# Patient Record
Sex: MALE | Race: OTHER | URBAN - METROPOLITAN AREA
[De-identification: names, ages, dates, MRNs, and addresses within clinical notes are randomized per-mention and may not be internally consistent; named-entity substitution may affect disease eponyms.]

---

## 2018-03-25 ENCOUNTER — EMERGENCY (EMERGENCY)
Facility: HOSPITAL | Age: 68
LOS: 0 days | Discharge: HOME | End: 2018-03-25
Attending: EMERGENCY MEDICINE
Payer: MEDICAID

## 2018-03-25 VITALS
DIASTOLIC BLOOD PRESSURE: 91 MMHG | HEART RATE: 80 BPM | RESPIRATION RATE: 16 BRPM | SYSTOLIC BLOOD PRESSURE: 169 MMHG | OXYGEN SATURATION: 98 %

## 2018-03-25 VITALS
WEIGHT: 169.98 LBS | SYSTOLIC BLOOD PRESSURE: 181 MMHG | OXYGEN SATURATION: 99 % | HEIGHT: 65 IN | RESPIRATION RATE: 18 BRPM | DIASTOLIC BLOOD PRESSURE: 116 MMHG | HEART RATE: 71 BPM

## 2018-03-25 DIAGNOSIS — I10 ESSENTIAL (PRIMARY) HYPERTENSION: ICD-10-CM

## 2018-03-25 DIAGNOSIS — R42 DIZZINESS AND GIDDINESS: ICD-10-CM

## 2018-03-25 DIAGNOSIS — R11.10 VOMITING, UNSPECIFIED: ICD-10-CM

## 2018-03-25 DIAGNOSIS — E11.9 TYPE 2 DIABETES MELLITUS WITHOUT COMPLICATIONS: ICD-10-CM

## 2018-03-25 LAB
ALBUMIN SERPL ELPH-MCNC: 3.4 G/DL — SIGNIFICANT CHANGE UP (ref 3.3–5)
ALP SERPL-CCNC: 73 U/L — SIGNIFICANT CHANGE UP (ref 40–120)
ALT FLD-CCNC: 65 U/L — SIGNIFICANT CHANGE UP (ref 12–78)
ANION GAP SERPL CALC-SCNC: 8 MMOL/L — SIGNIFICANT CHANGE UP (ref 5–17)
APTT BLD: 30.8 SEC — SIGNIFICANT CHANGE UP (ref 27.5–37.4)
AST SERPL-CCNC: 49 U/L — HIGH (ref 15–37)
BASOPHILS # BLD AUTO: 0.07 K/UL — SIGNIFICANT CHANGE UP (ref 0–0.2)
BASOPHILS NFR BLD AUTO: 0.7 % — SIGNIFICANT CHANGE UP (ref 0–2)
BILIRUB SERPL-MCNC: 0.2 MG/DL — SIGNIFICANT CHANGE UP (ref 0.2–1.2)
BUN SERPL-MCNC: 15 MG/DL — SIGNIFICANT CHANGE UP (ref 7–23)
CALCIUM SERPL-MCNC: 9.8 MG/DL — SIGNIFICANT CHANGE UP (ref 8.5–10.1)
CHLORIDE SERPL-SCNC: 101 MMOL/L — SIGNIFICANT CHANGE UP (ref 96–108)
CK MB CFR SERPL CALC: 1.3 NG/ML — SIGNIFICANT CHANGE UP (ref 0.5–3.6)
CO2 SERPL-SCNC: 29 MMOL/L — SIGNIFICANT CHANGE UP (ref 22–31)
CREAT SERPL-MCNC: 1.09 MG/DL — SIGNIFICANT CHANGE UP (ref 0.5–1.3)
EOSINOPHIL # BLD AUTO: 0.21 K/UL — SIGNIFICANT CHANGE UP (ref 0–0.5)
EOSINOPHIL NFR BLD AUTO: 2 % — SIGNIFICANT CHANGE UP (ref 0–6)
GLUCOSE BLDC GLUCOMTR-MCNC: 118 MG/DL — HIGH (ref 70–99)
GLUCOSE SERPL-MCNC: 133 MG/DL — HIGH (ref 70–99)
HCT VFR BLD CALC: 45.8 % — SIGNIFICANT CHANGE UP (ref 39–50)
HGB BLD-MCNC: 15 G/DL — SIGNIFICANT CHANGE UP (ref 13–17)
IMM GRANULOCYTES NFR BLD AUTO: 0.4 % — SIGNIFICANT CHANGE UP (ref 0–1.5)
INR BLD: 0.94 RATIO — SIGNIFICANT CHANGE UP (ref 0.88–1.16)
LYMPHOCYTES # BLD AUTO: 2.28 K/UL — SIGNIFICANT CHANGE UP (ref 1–3.3)
LYMPHOCYTES # BLD AUTO: 22.1 % — SIGNIFICANT CHANGE UP (ref 13–44)
MCHC RBC-ENTMCNC: 28.4 PG — SIGNIFICANT CHANGE UP (ref 27–34)
MCHC RBC-ENTMCNC: 32.8 GM/DL — SIGNIFICANT CHANGE UP (ref 32–36)
MCV RBC AUTO: 86.7 FL — SIGNIFICANT CHANGE UP (ref 80–100)
MONOCYTES # BLD AUTO: 0.9 K/UL — SIGNIFICANT CHANGE UP (ref 0–0.9)
MONOCYTES NFR BLD AUTO: 8.7 % — SIGNIFICANT CHANGE UP (ref 2–14)
NEUTROPHILS # BLD AUTO: 6.82 K/UL — SIGNIFICANT CHANGE UP (ref 1.8–7.4)
NEUTROPHILS NFR BLD AUTO: 66.1 % — SIGNIFICANT CHANGE UP (ref 43–77)
PLATELET # BLD AUTO: 316 K/UL — SIGNIFICANT CHANGE UP (ref 150–400)
POTASSIUM SERPL-MCNC: 3.6 MMOL/L — SIGNIFICANT CHANGE UP (ref 3.5–5.3)
POTASSIUM SERPL-SCNC: 3.6 MMOL/L — SIGNIFICANT CHANGE UP (ref 3.5–5.3)
PROT SERPL-MCNC: 7.5 GM/DL — SIGNIFICANT CHANGE UP (ref 6–8.3)
PROTHROM AB SERPL-ACNC: 10.2 SEC — SIGNIFICANT CHANGE UP (ref 9.8–12.7)
RBC # BLD: 5.28 M/UL — SIGNIFICANT CHANGE UP (ref 4.2–5.8)
RBC # FLD: 13.2 % — SIGNIFICANT CHANGE UP (ref 10.3–14.5)
SODIUM SERPL-SCNC: 138 MMOL/L — SIGNIFICANT CHANGE UP (ref 135–145)
TROPONIN I SERPL-MCNC: <.015 NG/ML — SIGNIFICANT CHANGE UP (ref 0.01–0.04)
WBC # BLD: 10.32 K/UL — SIGNIFICANT CHANGE UP (ref 3.8–10.5)
WBC # FLD AUTO: 10.32 K/UL — SIGNIFICANT CHANGE UP (ref 3.8–10.5)

## 2018-03-25 PROCEDURE — 70498 CT ANGIOGRAPHY NECK: CPT | Mod: 26

## 2018-03-25 PROCEDURE — 70450 CT HEAD/BRAIN W/O DYE: CPT | Mod: 26,59

## 2018-03-25 PROCEDURE — 99285 EMERGENCY DEPT VISIT HI MDM: CPT

## 2018-03-25 PROCEDURE — 70496 CT ANGIOGRAPHY HEAD: CPT | Mod: 26

## 2018-03-25 RX ORDER — SODIUM CHLORIDE 9 MG/ML
1000 INJECTION INTRAMUSCULAR; INTRAVENOUS; SUBCUTANEOUS ONCE
Qty: 0 | Refills: 0 | Status: COMPLETED | OUTPATIENT
Start: 2018-03-25 | End: 2018-03-25

## 2018-03-25 RX ORDER — MECLIZINE HCL 12.5 MG
1 TABLET ORAL
Qty: 15 | Refills: 0 | OUTPATIENT
Start: 2018-03-25 | End: 2018-03-29

## 2018-03-25 RX ORDER — ONDANSETRON 8 MG/1
4 TABLET, FILM COATED ORAL ONCE
Qty: 0 | Refills: 0 | Status: COMPLETED | OUTPATIENT
Start: 2018-03-25 | End: 2018-03-25

## 2018-03-25 RX ADMIN — SODIUM CHLORIDE 2000 MILLILITER(S): 9 INJECTION INTRAMUSCULAR; INTRAVENOUS; SUBCUTANEOUS at 18:57

## 2018-03-25 RX ADMIN — ONDANSETRON 4 MILLIGRAM(S): 8 TABLET, FILM COATED ORAL at 18:45

## 2018-03-25 NOTE — ED ADULT NURSE REASSESSMENT NOTE - NS ED NURSE REASSESS COMMENT FT1
Dr Brown called to r/o code stroke, didn't want stroke called , but pt sent straight to CT with NOEMI Angel
Received pt in stretcher sleeping, arousable with family at bedside. Pt in no apparent distress, awaiting dispo.

## 2018-03-25 NOTE — ED PROVIDER NOTE - PHYSICAL EXAMINATION
NIHSS: 0  Gen: No acute distress, non toxic  HEENT: Mucous membranes moist, pink conjunctivae, EOMI  CV: RRR, nl s1/s2.  Resp: CTAB, normal rate and effort  GI: Abdomen soft, NT, ND. No rebound, no guarding  : No CVAT  Neuro: A&O x 3, moving all 4 extremities. left beating nystagmus, cn 2-12 wnl, no ataxia. 5/5 strength.   MSK: No spine or joint tenderness to palpation  Skin: No rashes. intact and perfused.

## 2018-03-25 NOTE — ED PROVIDER NOTE - MEDICAL DECISION MAKING DETAILS
likely peripheral vertigo, ct head and cta head/neck wnl. candis hallpike/epley attempt, no improvement. valium/zofran/fluids. reassess. likely peripheral vertigo, ct head and cta head/neck wnl. candis hallpike/epley attempt, no improvement. valium/zofran/fluids. reassess.  labs and ct reassuring. patient feeling better after treatment and wants to go home. will dc on vertigo with ent follow up.

## 2018-03-25 NOTE — ED PROVIDER NOTE - OBJECTIVE STATEMENT
47 y/o male hx htn, dm c/o sudden onset vertigo and vomiting 40 minutes PTA. Never had this before, no headache, no other neuro symptoms. Severe symptoms and constant, not episodic, slightly worse when looking left. + left beating nystagmus. No cp, sob, abd pain, fever, or other symptoms. Denies trauma to neck.    ROS: No fever/chills. No eye pain/changes in vision, No ear pain/sore throat/dysphagia, No chest pain/palpitations. No SOB/cough/. No abdominal pain, N/D, no black/bloody bm. No dysuria/frequency/discharge, No headache. No rashes or breaks in skin. No numbness/tingling/weakness.

## 2025-03-24 ENCOUNTER — INPATIENT (INPATIENT)
Facility: HOSPITAL | Age: 75
LOS: 3 days | Discharge: HOME HEALTH | End: 2025-03-28
Attending: INTERNAL MEDICINE | Admitting: INTERNAL MEDICINE
Payer: MEDICARE

## 2025-03-24 VITALS
DIASTOLIC BLOOD PRESSURE: 60 MMHG | SYSTOLIC BLOOD PRESSURE: 106 MMHG | RESPIRATION RATE: 17 BRPM | WEIGHT: 160.06 LBS | TEMPERATURE: 99 F | HEIGHT: 67 IN | OXYGEN SATURATION: 95 % | HEART RATE: 72 BPM

## 2025-03-24 DIAGNOSIS — E87.20 ACIDOSIS, UNSPECIFIED: ICD-10-CM

## 2025-03-24 DIAGNOSIS — N17.9 ACUTE KIDNEY FAILURE, UNSPECIFIED: ICD-10-CM

## 2025-03-24 DIAGNOSIS — N45.3 EPIDIDYMO-ORCHITIS: ICD-10-CM

## 2025-03-24 DIAGNOSIS — N43.3 HYDROCELE, UNSPECIFIED: ICD-10-CM

## 2025-03-24 DIAGNOSIS — N40.0 BENIGN PROSTATIC HYPERPLASIA WITHOUT LOWER URINARY TRACT SYMPTOMS: ICD-10-CM

## 2025-03-24 DIAGNOSIS — E03.9 HYPOTHYROIDISM, UNSPECIFIED: ICD-10-CM

## 2025-03-24 DIAGNOSIS — K59.00 CONSTIPATION, UNSPECIFIED: ICD-10-CM

## 2025-03-24 DIAGNOSIS — I10 ESSENTIAL (PRIMARY) HYPERTENSION: ICD-10-CM

## 2025-03-24 DIAGNOSIS — E11.65 TYPE 2 DIABETES MELLITUS WITH HYPERGLYCEMIA: ICD-10-CM

## 2025-03-24 LAB
A1C WITH ESTIMATED AVERAGE GLUCOSE RESULT: 8.2 % — HIGH (ref 4–5.6)
ALBUMIN SERPL ELPH-MCNC: 3.1 G/DL — LOW (ref 3.3–5)
ALP SERPL-CCNC: 83 U/L — SIGNIFICANT CHANGE UP (ref 40–120)
ALT FLD-CCNC: 21 U/L — SIGNIFICANT CHANGE UP (ref 12–78)
ANION GAP SERPL CALC-SCNC: 4 MMOL/L — LOW (ref 5–17)
ANION GAP SERPL CALC-SCNC: 5 MMOL/L — SIGNIFICANT CHANGE UP (ref 5–17)
APPEARANCE UR: CLEAR — SIGNIFICANT CHANGE UP
AST SERPL-CCNC: 18 U/L — SIGNIFICANT CHANGE UP (ref 15–37)
BACTERIA # UR AUTO: ABNORMAL /HPF
BASOPHILS # BLD AUTO: 0 K/UL — SIGNIFICANT CHANGE UP (ref 0–0.2)
BASOPHILS NFR BLD AUTO: 0 % — SIGNIFICANT CHANGE UP (ref 0–2)
BILIRUB SERPL-MCNC: 0.4 MG/DL — SIGNIFICANT CHANGE UP (ref 0.2–1.2)
BILIRUB UR-MCNC: NEGATIVE — SIGNIFICANT CHANGE UP
BUN SERPL-MCNC: 18 MG/DL — SIGNIFICANT CHANGE UP (ref 7–23)
BUN SERPL-MCNC: 22 MG/DL — SIGNIFICANT CHANGE UP (ref 7–23)
CALCIUM SERPL-MCNC: 10.3 MG/DL — HIGH (ref 8.5–10.1)
CALCIUM SERPL-MCNC: 9.6 MG/DL — SIGNIFICANT CHANGE UP (ref 8.5–10.1)
CHLORIDE SERPL-SCNC: 103 MMOL/L — SIGNIFICANT CHANGE UP (ref 96–108)
CHLORIDE SERPL-SCNC: 105 MMOL/L — SIGNIFICANT CHANGE UP (ref 96–108)
CO2 SERPL-SCNC: 26 MMOL/L — SIGNIFICANT CHANGE UP (ref 22–31)
CO2 SERPL-SCNC: 27 MMOL/L — SIGNIFICANT CHANGE UP (ref 22–31)
COLOR SPEC: YELLOW — SIGNIFICANT CHANGE UP
CREAT SERPL-MCNC: 1.31 MG/DL — HIGH (ref 0.5–1.3)
CREAT SERPL-MCNC: 1.76 MG/DL — HIGH (ref 0.5–1.3)
DIFF PNL FLD: ABNORMAL
EGFR: 40 ML/MIN/1.73M2 — LOW
EGFR: 40 ML/MIN/1.73M2 — LOW
EGFR: 56 ML/MIN/1.73M2 — LOW
EGFR: 56 ML/MIN/1.73M2 — LOW
ELLIPTOCYTES BLD QL SMEAR: SLIGHT — SIGNIFICANT CHANGE UP
EOSINOPHIL # BLD AUTO: 0.14 K/UL — SIGNIFICANT CHANGE UP (ref 0–0.5)
EOSINOPHIL NFR BLD AUTO: 1 % — SIGNIFICANT CHANGE UP (ref 0–6)
ESTIMATED AVERAGE GLUCOSE: 189 MG/DL — HIGH (ref 68–114)
GLUCOSE BLDC GLUCOMTR-MCNC: 121 MG/DL — HIGH (ref 70–99)
GLUCOSE BLDC GLUCOMTR-MCNC: 129 MG/DL — HIGH (ref 70–99)
GLUCOSE BLDC GLUCOMTR-MCNC: 188 MG/DL — HIGH (ref 70–99)
GLUCOSE BLDC GLUCOMTR-MCNC: 212 MG/DL — HIGH (ref 70–99)
GLUCOSE SERPL-MCNC: 209 MG/DL — HIGH (ref 70–99)
GLUCOSE SERPL-MCNC: 216 MG/DL — HIGH (ref 70–99)
GLUCOSE UR QL: >=1000 MG/DL
GRAN CASTS # UR COMP ASSIST: PRESENT
HCT VFR BLD CALC: 44.2 % — SIGNIFICANT CHANGE UP (ref 39–50)
HCT VFR BLD CALC: 48.6 % — SIGNIFICANT CHANGE UP (ref 39–50)
HGB BLD-MCNC: 14.3 G/DL — SIGNIFICANT CHANGE UP (ref 13–17)
HGB BLD-MCNC: 15.5 G/DL — SIGNIFICANT CHANGE UP (ref 13–17)
KETONES UR-MCNC: NEGATIVE MG/DL — SIGNIFICANT CHANGE UP
LACTATE SERPL-SCNC: 1.5 MMOL/L — SIGNIFICANT CHANGE UP (ref 0.7–2)
LACTATE SERPL-SCNC: 3.3 MMOL/L — HIGH (ref 0.7–2)
LEUKOCYTE ESTERASE UR-ACNC: ABNORMAL
LG PLATELETS BLD QL AUTO: SLIGHT — SIGNIFICANT CHANGE UP
LIDOCAIN IGE QN: 41 U/L — SIGNIFICANT CHANGE UP (ref 13–75)
LYMPHOCYTES # BLD AUTO: 15 % — SIGNIFICANT CHANGE UP (ref 13–44)
LYMPHOCYTES # BLD AUTO: 2.17 K/UL — SIGNIFICANT CHANGE UP (ref 1–3.3)
MACROCYTES BLD QL: SLIGHT — SIGNIFICANT CHANGE UP
MANUAL SMEAR VERIFICATION: SIGNIFICANT CHANGE UP
MCHC RBC-ENTMCNC: 27.9 PG — SIGNIFICANT CHANGE UP (ref 27–34)
MCHC RBC-ENTMCNC: 28 PG — SIGNIFICANT CHANGE UP (ref 27–34)
MCHC RBC-ENTMCNC: 31.9 G/DL — LOW (ref 32–36)
MCHC RBC-ENTMCNC: 32.4 G/DL — SIGNIFICANT CHANGE UP (ref 32–36)
MCV RBC AUTO: 86.7 FL — SIGNIFICANT CHANGE UP (ref 80–100)
MCV RBC AUTO: 87.6 FL — SIGNIFICANT CHANGE UP (ref 80–100)
MONOCYTES # BLD AUTO: 1.88 K/UL — HIGH (ref 0–0.9)
MONOCYTES NFR BLD AUTO: 13 % — SIGNIFICANT CHANGE UP (ref 2–14)
NEUTROPHILS # BLD AUTO: 10.26 K/UL — HIGH (ref 1.8–7.4)
NEUTROPHILS NFR BLD AUTO: 71 % — SIGNIFICANT CHANGE UP (ref 43–77)
NITRITE UR-MCNC: NEGATIVE — SIGNIFICANT CHANGE UP
NRBC # BLD: 0 /100 WBCS — SIGNIFICANT CHANGE UP (ref 0–0)
NRBC BLD AUTO-RTO: 0 /100 WBCS — SIGNIFICANT CHANGE UP (ref 0–0)
NRBC BLD AUTO-RTO: SIGNIFICANT CHANGE UP /100 WBCS (ref 0–0)
NRBC BLD-RTO: 0 /100 WBCS — SIGNIFICANT CHANGE UP (ref 0–0)
PH UR: 6.5 — SIGNIFICANT CHANGE UP (ref 5–8)
PLAT MORPH BLD: NORMAL — SIGNIFICANT CHANGE UP
PLATELET # BLD AUTO: 256 K/UL — SIGNIFICANT CHANGE UP (ref 150–400)
PLATELET # BLD AUTO: 272 K/UL — SIGNIFICANT CHANGE UP (ref 150–400)
POTASSIUM SERPL-MCNC: 4.1 MMOL/L — SIGNIFICANT CHANGE UP (ref 3.5–5.3)
POTASSIUM SERPL-MCNC: 4.3 MMOL/L — SIGNIFICANT CHANGE UP (ref 3.5–5.3)
POTASSIUM SERPL-SCNC: 4.1 MMOL/L — SIGNIFICANT CHANGE UP (ref 3.5–5.3)
POTASSIUM SERPL-SCNC: 4.3 MMOL/L — SIGNIFICANT CHANGE UP (ref 3.5–5.3)
PROT SERPL-MCNC: 8 GM/DL — SIGNIFICANT CHANGE UP (ref 6–8.3)
PROT UR-MCNC: NEGATIVE MG/DL — SIGNIFICANT CHANGE UP
RBC # BLD: 5.1 M/UL — SIGNIFICANT CHANGE UP (ref 4.2–5.8)
RBC # BLD: 5.55 M/UL — SIGNIFICANT CHANGE UP (ref 4.2–5.8)
RBC # FLD: 14.4 % — SIGNIFICANT CHANGE UP (ref 10.3–14.5)
RBC # FLD: 14.5 % — SIGNIFICANT CHANGE UP (ref 10.3–14.5)
RBC BLD AUTO: ABNORMAL
RBC CASTS # UR COMP ASSIST: 2 /HPF — SIGNIFICANT CHANGE UP (ref 0–4)
SODIUM SERPL-SCNC: 135 MMOL/L — SIGNIFICANT CHANGE UP (ref 135–145)
SODIUM SERPL-SCNC: 135 MMOL/L — SIGNIFICANT CHANGE UP (ref 135–145)
SP GR SPEC: >1.03 — HIGH (ref 1–1.03)
UROBILINOGEN FLD QL: 0.2 MG/DL — SIGNIFICANT CHANGE UP (ref 0.2–1)
WBC # BLD: 13.55 K/UL — HIGH (ref 3.8–10.5)
WBC # BLD: 14.45 K/UL — HIGH (ref 3.8–10.5)
WBC # FLD AUTO: 13.55 K/UL — HIGH (ref 3.8–10.5)
WBC # FLD AUTO: 14.45 K/UL — HIGH (ref 3.8–10.5)
WBC UR QL: ABNORMAL /HPF (ref 0–5)

## 2025-03-24 PROCEDURE — 74177 CT ABD & PELVIS W/CONTRAST: CPT | Mod: 26

## 2025-03-24 PROCEDURE — 99232 SBSQ HOSP IP/OBS MODERATE 35: CPT

## 2025-03-24 PROCEDURE — 76870 US EXAM SCROTUM: CPT | Mod: 26

## 2025-03-24 PROCEDURE — G0545: CPT

## 2025-03-24 PROCEDURE — 99222 1ST HOSP IP/OBS MODERATE 55: CPT

## 2025-03-24 PROCEDURE — 99285 EMERGENCY DEPT VISIT HI MDM: CPT | Mod: 25

## 2025-03-24 RX ORDER — DEXTROSE 50 % IN WATER 50 %
12.5 SYRINGE (ML) INTRAVENOUS ONCE
Refills: 0 | Status: DISCONTINUED | OUTPATIENT
Start: 2025-03-24 | End: 2025-03-28

## 2025-03-24 RX ORDER — LEVOTHYROXINE SODIUM 300 MCG
1 TABLET ORAL
Refills: 0 | DISCHARGE

## 2025-03-24 RX ORDER — POLYETHYLENE GLYCOL 3350 17 G/17G
17 POWDER, FOR SOLUTION ORAL DAILY
Refills: 0 | Status: DISCONTINUED | OUTPATIENT
Start: 2025-03-24 | End: 2025-03-25

## 2025-03-24 RX ORDER — CEFTRIAXONE 500 MG/1
1000 INJECTION, POWDER, FOR SOLUTION INTRAMUSCULAR; INTRAVENOUS EVERY 24 HOURS
Refills: 0 | Status: DISCONTINUED | OUTPATIENT
Start: 2025-03-25 | End: 2025-03-24

## 2025-03-24 RX ORDER — ACETAMINOPHEN 500 MG/5ML
1000 LIQUID (ML) ORAL ONCE
Refills: 0 | Status: COMPLETED | OUTPATIENT
Start: 2025-03-24 | End: 2025-03-24

## 2025-03-24 RX ORDER — OLMESARTAN MEDOXOMIL 5 MG/1
1 TABLET, FILM COATED ORAL
Refills: 0 | DISCHARGE

## 2025-03-24 RX ORDER — MAGNESIUM, ALUMINUM HYDROXIDE 200-200 MG
30 TABLET,CHEWABLE ORAL EVERY 4 HOURS
Refills: 0 | Status: DISCONTINUED | OUTPATIENT
Start: 2025-03-24 | End: 2025-03-28

## 2025-03-24 RX ORDER — DOXYCYCLINE HYCLATE 100 MG
100 TABLET ORAL EVERY 12 HOURS
Refills: 0 | Status: DISCONTINUED | OUTPATIENT
Start: 2025-03-24 | End: 2025-03-24

## 2025-03-24 RX ORDER — DEXTROSE 50 % IN WATER 50 %
25 SYRINGE (ML) INTRAVENOUS ONCE
Refills: 0 | Status: DISCONTINUED | OUTPATIENT
Start: 2025-03-24 | End: 2025-03-28

## 2025-03-24 RX ORDER — TAMSULOSIN HYDROCHLORIDE 0.4 MG/1
0.4 CAPSULE ORAL AT BEDTIME
Refills: 0 | Status: DISCONTINUED | OUTPATIENT
Start: 2025-03-24 | End: 2025-03-28

## 2025-03-24 RX ORDER — CEFTRIAXONE 500 MG/1
1000 INJECTION, POWDER, FOR SOLUTION INTRAMUSCULAR; INTRAVENOUS ONCE
Refills: 0 | Status: COMPLETED | OUTPATIENT
Start: 2025-03-24 | End: 2025-03-24

## 2025-03-24 RX ORDER — METFORMIN HYDROCHLORIDE 850 MG/1
1 TABLET ORAL
Refills: 0 | DISCHARGE

## 2025-03-24 RX ORDER — SODIUM CHLORIDE 9 G/1000ML
1000 INJECTION, SOLUTION INTRAVENOUS
Refills: 0 | Status: DISCONTINUED | OUTPATIENT
Start: 2025-03-24 | End: 2025-03-28

## 2025-03-24 RX ORDER — LEVOTHYROXINE SODIUM 300 MCG
175 TABLET ORAL DAILY
Refills: 0 | Status: DISCONTINUED | OUTPATIENT
Start: 2025-03-24 | End: 2025-03-28

## 2025-03-24 RX ORDER — ACETAMINOPHEN 500 MG/5ML
650 LIQUID (ML) ORAL EVERY 6 HOURS
Refills: 0 | Status: DISCONTINUED | OUTPATIENT
Start: 2025-03-24 | End: 2025-03-28

## 2025-03-24 RX ORDER — MELATONIN 5 MG
3 TABLET ORAL AT BEDTIME
Refills: 0 | Status: DISCONTINUED | OUTPATIENT
Start: 2025-03-24 | End: 2025-03-28

## 2025-03-24 RX ORDER — TAMSULOSIN HYDROCHLORIDE 0.4 MG/1
1 CAPSULE ORAL
Refills: 0 | DISCHARGE

## 2025-03-24 RX ORDER — SODIUM CHLORIDE 9 G/1000ML
1000 INJECTION, SOLUTION INTRAVENOUS ONCE
Refills: 0 | Status: COMPLETED | OUTPATIENT
Start: 2025-03-24 | End: 2025-03-24

## 2025-03-24 RX ORDER — INSULIN LISPRO 100 U/ML
INJECTION, SOLUTION INTRAVENOUS; SUBCUTANEOUS
Refills: 0 | Status: DISCONTINUED | OUTPATIENT
Start: 2025-03-24 | End: 2025-03-28

## 2025-03-24 RX ORDER — DOXYCYCLINE HYCLATE 100 MG
100 TABLET ORAL EVERY 12 HOURS
Refills: 0 | Status: DISCONTINUED | OUTPATIENT
Start: 2025-03-24 | End: 2025-03-25

## 2025-03-24 RX ORDER — DEXTROSE 50 % IN WATER 50 %
15 SYRINGE (ML) INTRAVENOUS ONCE
Refills: 0 | Status: DISCONTINUED | OUTPATIENT
Start: 2025-03-24 | End: 2025-03-28

## 2025-03-24 RX ORDER — GLUCAGON 3 MG/1
1 POWDER NASAL ONCE
Refills: 0 | Status: DISCONTINUED | OUTPATIENT
Start: 2025-03-24 | End: 2025-03-28

## 2025-03-24 RX ORDER — METOPROLOL SUCCINATE 50 MG/1
1 TABLET, EXTENDED RELEASE ORAL
Refills: 0 | DISCHARGE

## 2025-03-24 RX ORDER — ONDANSETRON HCL/PF 4 MG/2 ML
4 VIAL (ML) INJECTION EVERY 8 HOURS
Refills: 0 | Status: DISCONTINUED | OUTPATIENT
Start: 2025-03-24 | End: 2025-03-28

## 2025-03-24 RX ORDER — ASPIRIN 325 MG
81 TABLET ORAL DAILY
Refills: 0 | Status: DISCONTINUED | OUTPATIENT
Start: 2025-03-24 | End: 2025-03-28

## 2025-03-24 RX ORDER — ASPIRIN 325 MG
1 TABLET ORAL
Refills: 0 | DISCHARGE

## 2025-03-24 RX ORDER — METOPROLOL SUCCINATE 50 MG/1
100 TABLET, EXTENDED RELEASE ORAL AT BEDTIME
Refills: 0 | Status: DISCONTINUED | OUTPATIENT
Start: 2025-03-24 | End: 2025-03-28

## 2025-03-24 RX ORDER — SENNA 187 MG
2 TABLET ORAL AT BEDTIME
Refills: 0 | Status: DISCONTINUED | OUTPATIENT
Start: 2025-03-24 | End: 2025-03-25

## 2025-03-24 RX ORDER — CEFTRIAXONE 500 MG/1
2000 INJECTION, POWDER, FOR SOLUTION INTRAMUSCULAR; INTRAVENOUS EVERY 24 HOURS
Refills: 0 | Status: COMPLETED | OUTPATIENT
Start: 2025-03-24 | End: 2025-03-27

## 2025-03-24 RX ADMIN — TAMSULOSIN HYDROCHLORIDE 0.4 MILLIGRAM(S): 0.4 CAPSULE ORAL at 21:02

## 2025-03-24 RX ADMIN — Medication 100 MILLIGRAM(S): at 18:29

## 2025-03-24 RX ADMIN — Medication 20 MILLIGRAM(S): at 11:27

## 2025-03-24 RX ADMIN — CEFTRIAXONE 100 MILLIGRAM(S): 500 INJECTION, POWDER, FOR SOLUTION INTRAMUSCULAR; INTRAVENOUS at 03:25

## 2025-03-24 RX ADMIN — Medication 2 TABLET(S): at 21:02

## 2025-03-24 RX ADMIN — INSULIN LISPRO 2: 100 INJECTION, SOLUTION INTRAVENOUS; SUBCUTANEOUS at 16:45

## 2025-03-24 RX ADMIN — Medication 100 MILLIGRAM(S): at 06:38

## 2025-03-24 RX ADMIN — Medication 1 MILLIGRAM(S): at 08:15

## 2025-03-24 RX ADMIN — Medication 400 MILLIGRAM(S): at 13:28

## 2025-03-24 RX ADMIN — Medication 3 MILLIGRAM(S): at 21:03

## 2025-03-24 RX ADMIN — Medication 1 MILLIGRAM(S): at 08:45

## 2025-03-24 RX ADMIN — Medication 81 MILLIGRAM(S): at 11:27

## 2025-03-24 RX ADMIN — Medication 1000 MILLILITER(S): at 02:03

## 2025-03-24 RX ADMIN — Medication 4 MILLIGRAM(S): at 02:03

## 2025-03-24 RX ADMIN — SODIUM CHLORIDE 1000 MILLILITER(S): 9 INJECTION, SOLUTION INTRAVENOUS at 05:18

## 2025-03-24 RX ADMIN — Medication 175 MICROGRAM(S): at 06:28

## 2025-03-24 RX ADMIN — METOPROLOL SUCCINATE 100 MILLIGRAM(S): 50 TABLET, EXTENDED RELEASE ORAL at 21:48

## 2025-03-24 RX ADMIN — Medication 1000 MILLIGRAM(S): at 14:00

## 2025-03-24 RX ADMIN — POLYETHYLENE GLYCOL 3350 17 GRAM(S): 17 POWDER, FOR SOLUTION ORAL at 11:27

## 2025-03-24 RX ADMIN — INSULIN LISPRO 1: 100 INJECTION, SOLUTION INTRAVENOUS; SUBCUTANEOUS at 11:26

## 2025-03-24 NOTE — ED PROVIDER NOTE - OBJECTIVE STATEMENT
75 M pmh HTN, DM presenting to the ED for L testicular pain and swelling x 2 days   remote hx of hernia repair > 20 years ago. no vomiting but constipation x 4 days

## 2025-03-24 NOTE — ED PROVIDER NOTE - CONSIDERATION OF ADMISSION OBSERVATION
will admit for epididymoorchitis with concern for pyocele and elevated lactate. pt to need IV abx,  evaluation Consideration of Admission/Observation

## 2025-03-24 NOTE — CONSULT NOTE ADULT - REASON FOR ADMISSION
Left epididymoorchitis with large complex left hydrocele/pyocele
Left epididymoorchitis with large complex left hydrocele/pyocele

## 2025-03-24 NOTE — H&P ADULT - NSICDXPASTMEDICALHX_GEN_ALL_CORE_FT
PAST MEDICAL HISTORY:  BPH (benign prostatic hyperplasia)     Diabetes mellitus type II, non insulin dependent     HTN (hypertension)     Hypothyroidism

## 2025-03-24 NOTE — ED PROVIDER NOTE - PHYSICAL EXAMINATION
General: Well appearing elderly male in no acute distress  HEENT: Normocephalic, atraumatic. Moist mucous membranes. Oropharynx clear. No lymphadenopathy.  Eyes: No scleral icterus. EOMI. SANDY.  Neck:. Soft and supple. Full ROM without pain. No midline tenderness  Cardiac: Regular rate and regular rhythm. No murmurs, rubs, gallops. Peripheral pulses 2+ and symmetric. No LE edema.  Resp: Lungs CTAB. Speaking in full sentences. No wheezes, rales or rhonchi.  Abd: Soft, non-tender, non-distended. No guarding or rebound.  : L testicular swelling and ttp. no penile swelling or discharge. + cremasteric reflex  Back: Spine midline and non-tender. No CVA tenderness.    Skin: No rashes, abrasions, or lacerations.  Neuro: AO x 3. Moves all extremities symmetrically. Motor strength and sensation grossly intact.

## 2025-03-24 NOTE — GOALS OF CARE CONVERSATION - ADVANCED CARE PLANNING - CONVERSATION DETAILS
Code status is full code. Would want chest compressions and intubation if needed. Wants all life-sustaining measures. No limitations on care at this time. . Wants daughter, Eugenia Jamison ([phone]) to make his medical decisions for him if he were to become unable to do so on his own.

## 2025-03-24 NOTE — ED PROVIDER NOTE - CLINICAL SUMMARY MEDICAL DECISION MAKING FREE TEXT BOX
75 M pmh HTN, DM presenting to the ED for L testicular pain and swelling x 2 days    +cremasteric reflex  pt last sexually active 2 months ago  concern for epididymitis, r/o torsion. r/o potential hernia w/ obstruction  labs, UA  US testicles, CT abd/pelvis  pain control  IV fluids, abx  medicine admit

## 2025-03-24 NOTE — H&P ADULT - NSHPLABSRESULTS_GEN_ALL_CORE
15.5   14.45 )-----------( 272      ( 24 Mar 2025 02:00 )             48.6     135  |  103  |  22  ----------------------------<  209[H]     03-24  4.3   |  27  |  1.76[H]    Ca    10.3[H]      24 Mar 2025 02:00    TPro  8.0  /  Alb  3.1[L]  /  TBili  0.4  /  DBili  x   /  AST  18  /  ALT  21  /  AlkPhos  83  03-24    U/S testicles 3/24/25  FINDINGS:  RIGHT:  Right testis: 4.2 x 2.7 x 1.9 cm. Normal echogenicity and echotexture with no masses or areas of architectural distortion. Normal arterial and venous blood flow pattern.  Right epididymis: Within normal limits.  Right hydrocele: None.  Right varicocele: None.    LEFT:  Left testis: 2.9 x 2.2 x 2.0 cm. Increased vascularity  Left epididymis: Thickened and heterogeneous with increased vascularity  Left hydrocele: Large complex left hydrocele  Left varicocele: None.    IMPRESSION:  Left epididymoorchitis with large complex left hydrocele/pyocele    CT A/P with IV contrast 3/24/25  FINDINGS:  LOWER CHEST: Mild atelectatic changes. Bibasilar air trapping and bronchial wall thickening; consider outpatient pulmonology evaluation.    LIVER: Diffuse steatosis. Indeterminate subcentimeter liver hypodensities are too small to adequately characterize; statistically favored to represent cysts.  BILE DUCTS: Normal caliber.  GALLBLADDER: Cholelithiasis. 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  SPLEEN: Within normal limits.  PANCREAS: Within normal limits.  ADRENALS: Within normal limits.  KIDNEYS/URETERS: Within normal limits.    BLADDER: Wall thickening may be further assessed with urinalysis and urine cultures.  REPRODUCTIVE ORGANS: Enlarged prostate gland within    BOWEL: No bowel obstruction. Appendix is normal. Mild constipation.  PERITONEUM/RETROPERITONEUM: Within normal limits.  VESSELS: Atherosclerotic changes. Hemodynamically significant stenosis suspected involving the proximal segments of both renal arteries; this is a subjective opinion.  LYMPH NODES: No lymphadenopathy.  ABDOMINAL WALL: Small fat-containing left inguinal hernia.  BONES: Degenerative changes.    IMPRESSION:  No acute abnormality detected. No evidence of bowel obstruction. Mild constipation.

## 2025-03-24 NOTE — H&P ADULT - ASSESSMENT
Fredi Rene is a 75 year old male with PMHx of HTN, NIDDM2, hypothyroidism, and BPH who presented to the ED on 3/24/25 for complaints of left testicular pain and swelling and admitted for left epididymoorchitis with large complex left hydrocele/pyocele.    Left epididymoorchitis with large complex left hydrocele/pyocele  Reports left testicular pain and swelling x 3 days, initially intermittent in nature and then became constant  Tried taking tylenol without improvement of pain, no associated fevers or chills  Sexually active with wife, last sexual intercourse two months ago  Went to urgent care, found to have +cremasteric reflex, sent to ER  Tmax 99.1 (likely falsely low due to taking antipyretics at home) and WBC 14.45K on admission  U/S testicles with L. epididymoorchitis with large complex L. hydrocele/pyocele  CT A/P without acute abnormality detected or evidence of bowel obstruction   S/p ceftriaxone 1 g IV and morphine 4 mg IV in the ED  Doxycycline 100 mg BID x 10 days started, pain management PRN  F/u U/A, chlamydia/neisseria   Monitor temperature curve and WBC trend  Pending urology evaluation - please f/u in AM    Lactic acidosis  Lactic acid 3.3 on admission  S/p 1L NS bolus in the ED, additional 1L LR bolus ordered  F/u serial lactates    BRENDAN  Cr 1.76 on admission, unknown baseline Cr but Cr 1.09 (on 3/25/19) and pt denies hx of renal dysfunction  S/p 1L NS bolus in the ED, additional 1L LR bolus ordered  Avoid nephrotoxins, renally dose meds  Encourage PO hydration  Monitor renal function    Constipation  Reports typically having one bowel movement daily but has not had one since Friday  CT A/P with with mild constipation  Bowel regimen with senna and Miralax started  Can consider mag citrate if no BM  Monitor for bowel movement    Bibasilar air trapping and bronchial wall thickening, incidental finding  Denies shortness of breath or cough  Consider outpatient pulmonology evaluation as per radiologist recommendations    Liver hypodensities, incidental finding  CT A/P with diffuse steatosis and ndeterminate subcentimeter liver hypodensities are too small to adequately characterize; statistically favored to represent cysts  Will need outpatient f/u      Chronic medical conditions:  HTN: PTA metoprolol succinate 100 mg qhs, olmesartan 40 mg held due to BRENDAN  NIDDM2 with hyperglycemia: blood glucose 209 on admission, POC qac and qhs, PTA metformin 500 mg BID held, low dose SSI qac started, blood glucose goal < 180, f/u A1c  Hypothyroidism: PTA levothyroxine 175 mcg  BPH: PTA tamsulosin 0.4 mg    Medication reconciliation completed using med list provided by daughter at bedside.    Plan of care discussed with daughter at bedside. Fredi Rene is a 75 year old male with PMHx of HTN, NIDDM2, hypothyroidism, and BPH who presented to the ED on 3/24/25 for complaints of left testicular pain and swelling and admitted for left epididymoorchitis with large complex left hydrocele/pyocele.    Left epididymoorchitis with large complex left hydrocele/pyocele  Reports left testicular pain and swelling x 3 days, initially intermittent in nature and then became constant  Tried taking tylenol without improvement of pain, no associated fevers or chills  Sexually active with wife, last sexual intercourse two months ago  Went to urgent care, found to have absent cremasteric reflex upon NP eval, sent to ER  Tmax 99.1 (likely falsely low due to taking antipyretics at home) and WBC 14.45K on admission  U/S testicles with L. epididymoorchitis with large complex L. hydrocele/pyocele  CT A/P without acute abnormality detected or evidence of bowel obstruction   S/p ceftriaxone 1 g IV and morphine 4 mg IV in the ED  Doxycycline 100 mg BID x 10 days started, pain management PRN  F/u U/A, chlamydia/neisseria   Monitor temperature curve and WBC trend  Pending urology evaluation - please f/u in AM    Lactic acidosis  Lactic acid 3.3 on admission  S/p 1L NS bolus in the ED, additional 1L LR bolus ordered  F/u serial lactates    BRENDAN  Cr 1.76 on admission, unknown baseline Cr but Cr 1.09 (on 3/25/19) and pt denies hx of renal dysfunction  S/p 1L NS bolus in the ED, additional 1L LR bolus ordered  Avoid nephrotoxins, renally dose meds  Encourage PO hydration  Monitor renal function    Constipation  Reports typically having one bowel movement daily but has not had one since Friday  CT A/P with with mild constipation  Bowel regimen with senna and Miralax started  Can consider mag citrate if no BM  Monitor for bowel movement    Bibasilar air trapping and bronchial wall thickening, incidental finding  Denies shortness of breath or cough  Consider outpatient pulmonology evaluation as per radiologist recommendations    Liver hypodensities, incidental finding  CT A/P with diffuse steatosis and ndeterminate subcentimeter liver hypodensities are too small to adequately characterize; statistically favored to represent cysts  Will need outpatient f/u      Chronic medical conditions:  HTN: PTA metoprolol succinate 100 mg qhs, olmesartan 40 mg held due to BRENDAN  NIDDM2 with hyperglycemia: blood glucose 209 on admission, POC qac and qhs, PTA metformin 500 mg BID held, low dose SSI qac started, blood glucose goal < 180, f/u A1c  Hypothyroidism: PTA levothyroxine 175 mcg  BPH: PTA tamsulosin 0.4 mg    Medication reconciliation completed using med list provided by daughter at bedside.    Plan of care discussed with daughter at bedside. Fredi Rene is a 75 year old male with PMHx of HTN, NIDDM2, hypothyroidism, and BPH who presented to the ED on 3/24/25 for complaints of left testicular pain and swelling and admitted for left epididymoorchitis with large complex left hydrocele/pyocele.    Left epididymoorchitis with large complex left hydrocele/pyocele  Reports left testicular pain and swelling x 3 days, initially intermittent in nature and then became constant  Tried taking tylenol without improvement of pain, no associated fevers or chills  Sexually active with wife, last sexual intercourse two months ago  Went to urgent care, found to have absent cremasteric reflex upon NP eval, sent to ER  Tmax 99.1 (likely falsely low due to taking antipyretics at home) and WBC 14.45K on admission  U/S testicles with L. epididymoorchitis with large complex L. hydrocele/pyocele  CT A/P without acute abnormality detected or evidence of bowel obstruction   S/p ceftriaxone 1 g IV and morphine 4 mg IV in the ED  Doxycycline 100 mg BID x 10 days started, pain management PRN  F/u U/A, chlamydia/neisseria   Monitor temperature curve and WBC trend  Pending urology evaluation - please f/u in AM    Lactic acidosis  Lactic acid 3.3 on admission  S/p 1L NS bolus in the ED, additional 1L LR bolus ordered  F/u serial lactates    BRENDAN  Cr 1.76 on admission, unknown baseline Cr but Cr 1.09 (on 3/25/19) and pt denies hx of renal dysfunction  S/p 1L NS bolus in the ED, additional 1L LR bolus ordered  Avoid nephrotoxins, renally dose meds  Encourage PO hydration  Monitor renal function    Constipation  Reports typically having one bowel movement daily but has not had one since Friday  CT A/P with with mild constipation  Bowel regimen with senna and Miralax started  Can consider mag citrate if no BM  Monitor for bowel movement    Bibasilar air trapping and bronchial wall thickening, incidental finding  Denies shortness of breath or cough  Consider outpatient pulmonology evaluation as per radiologist recommendation    Liver hypodensities, incidental finding  CT A/P with diffuse steatosis and ndeterminate subcentimeter liver hypodensities are too small to adequately characterize; statistically favored to represent cysts  Will need outpatient f/u      Chronic medical conditions:  HTN: PTA metoprolol succinate 100 mg qhs, olmesartan 40 mg held due to BRENDAN  NIDDM2 with hyperglycemia: blood glucose 209 on admission, POC qac and qhs, PTA metformin 500 mg BID held, low dose SSI qac started, blood glucose goal < 180, f/u A1c  Hypothyroidism: PTA levothyroxine 175 mcg  BPH: PTA tamsulosin 0.4 mg    Medication reconciliation completed using med list provided by daughter at bedside.    Plan of care discussed with daughter at bedside.

## 2025-03-24 NOTE — CONSULT NOTE ADULT - SUBJECTIVE AND OBJECTIVE BOX
Chief Complaint:  Patient is a 75y old  Male who presents with a chief complaint of Left epididymoorchitis with large complex left hydrocele/pyocele (24 Mar 2025 04:23)      HPI:  Fredi Rene is a 75 year old male with PMHx of HTN, NIDDM2, hypothyroidism, and BPH who presented to the ED on 3/24/25 for complaints of left testicular pain and swelling.     Patient is Urdu speaking but declined  services and preferred daughter at bedside to translate. As per daughter at bedside, patient started having left testicular pain and swelling since Friday. Initially intermittent in nature and then became constant. Tried taking tylenol for the pain without alleviation. Severity was 10/10. No associated fevers or chills. Sexually active with wife, last sexual intercourse two months ago. Also having constipation since Friday. Typically has one bowel movement daily but has not had one since Friday. Went to urgent care last night, evaluated by NP there, and sent to the ER for further evaluation given absent cremasteric reflex on exam. Baseline functional status is ambulates with cane and independent with all ADLs. Lives at home with wife and daughter.    In the ED, VSS except Tmax 99.1. WBC 14.45K, Cr 1.76, blood glucose 209. Lactic acid 3.3. U/S testicles with left epididymoorchitis with large complex left hydrocele/pyocele. CT A/P without acute abnormality detected or evidence of bowel obstruction but with mild constipation. Received 1L NS bolus, ceftriaxone 1 g IV, and morphine 4 mg IV. ER physician contacted urology to evaluate patient. (24 Mar 2025 04:23)      PMH/PSH:PAST MEDICAL & SURGICAL HISTORY:  HTN (hypertension)      Diabetes mellitus type II, non insulin dependent      BPH (benign prostatic hyperplasia)      Hypothyroidism          Allergies:  No Known Allergies      Medications:  acetaminophen     Tablet .. 650 milliGRAM(s) Oral every 6 hours PRN  aluminum hydroxide/magnesium hydroxide/simethicone Suspension 30 milliLiter(s) Oral every 4 hours PRN  aspirin  chewable 81 milliGRAM(s) Oral daily  dextrose 5%. 1000 milliLiter(s) IV Continuous <Continuous>  dextrose 5%. 1000 milliLiter(s) IV Continuous <Continuous>  dextrose 50% Injectable 25 Gram(s) IV Push once  dextrose 50% Injectable 12.5 Gram(s) IV Push once  dextrose 50% Injectable 25 Gram(s) IV Push once  dextrose Oral Gel 15 Gram(s) Oral once PRN  doxycycline monohydrate Capsule 100 milliGRAM(s) Oral every 12 hours  famotidine    Tablet 20 milliGRAM(s) Oral daily  glucagon  Injectable 1 milliGRAM(s) IntraMuscular once  insulin lispro (ADMELOG) corrective regimen sliding scale   SubCutaneous three times a day before meals  levothyroxine 175 MICROGram(s) Oral daily  melatonin 3 milliGRAM(s) Oral at bedtime PRN  metoprolol succinate  milliGRAM(s) Oral at bedtime  morphine  - Injectable 1 milliGRAM(s) IV Push every 6 hours PRN  ondansetron Injectable 4 milliGRAM(s) IV Push every 8 hours PRN  polyethylene glycol 3350 17 Gram(s) Oral daily  senna 2 Tablet(s) Oral at bedtime  tamsulosin 0.4 milliGRAM(s) Oral at bedtime      REVIEW OF SYSTEMS:  All other review of systems is negative unless indicated above.    Relevant Family History:   FAMILY HISTORY:      Relevant Social History:  Denies ETOH or tobacco history    Physical Exam:    Vital Signs:  Vital Signs Last 24 Hrs  T(C): 37.1 (24 Mar 2025 05:42), Max: 37.3 (24 Mar 2025 01:29)  T(F): 98.7 (24 Mar 2025 05:42), Max: 99.1 (24 Mar 2025 01:29)  HR: 75 (24 Mar 2025 05:42) (71 - 75)  BP: 150/82 (24 Mar 2025 05:42) (106/60 - 150/84)  BP(mean): --  RR: 18 (24 Mar 2025 05:42) (17 - 18)  SpO2: 96% (24 Mar 2025 05:42) (95% - 96%)    Parameters below as of 24 Mar 2025 01:29  Patient On (Oxygen Delivery Method): room air      Daily Height in cm: 170.18 (24 Mar 2025 01:29)    Daily     Constitutional: non toxic, NAD  Respiratory: breathing normally  Gastrointestinal: soft, NT/ND;   : warm, swollen and erythematous left testicle, no cellulitic changes to skin, no crepitus  Neurologic: AAOx3;    Imaging:    < from: US Testicles (03.24.25 @ 02:22) >      INTERPRETATION:  CLINICAL INFORMATION: Rule out torsion    COMPARISON: None available.    TECHNIQUE: Testicular ultrasound utilizing color and spectral Doppler.    FINDINGS:    RIGHT:  Right testis: 4.2 x 2.7 x 1.9 cm. Normal echogenicity and echotexture   with no masses or areas of architectural distortion. Normal arterial and   venous blood flow pattern.  Right epididymis: Within normal limits.  Right hydrocele: None.  Right varicocele: None.  <empty>    LEFT:  Left testis: 2.9 x 2.2 x 2.0 cm. Increased vascularity  Left epididymis: Thickened and heterogeneous with increased vascularity  Left hydrocele: Large complex left hydrocele  Left varicocele: None.  <empty>    IMPRESSION:    Left epididymoorchitis with large complex left hydrocele/pyocele        --- End of Report ---      < end of copied text >

## 2025-03-24 NOTE — H&P ADULT - NSHPPHYSICALEXAM_GEN_ALL_CORE
T(C): 37.2 (03-24-25 @ 04:26), Max: 37.3 (03-24-25 @ 01:29)  HR: 71 (03-24-25 @ 04:26) (71 - 72)  BP: 150/84 (03-24-25 @ 04:26) (106/60 - 150/84)  RR: 18 (03-24-25 @ 04:26) (17 - 18)  SpO2: 95% (03-24-25 @ 04:26) (95% - 95%)    CONSTITUTIONAL: Well groomed, pleasant, conversational  EYES: PERRLA and symmetric, EOMI  ENMT: Oral mucosa with moist membranes  RESP: No respiratory distress, no use of accessory muscles; CTA b/l  CV: RRR  GI: Soft, NT, ND  : L. testicle swollen, erythematous, tender to palpation, and with concomitant large hydrocele noted

## 2025-03-24 NOTE — H&P ADULT - HISTORY OF PRESENT ILLNESS
Fredi Rene is a 75 year old male with PMHx of HTN, NIDDM2, hypothyroidism, and BPH who presented to the ED on 3/24/25 for complaints of left testicular pain and swelling.     Patient is Hungarian speaking but declined  services and preferred daughter at bedside to translate. As per daughter at bedside, patient started having left testicular pain and swelling since Friday. Initially intermittent in nature and then became constant. Tried taking tylenol for the pain without alleviation. Severity was 10/10. No associated fevers or chills. Sexually active with wife, last sexual intercourse two months ago. Also having constipation since Friday. Typically has one bowel movement daily but has not had one since Friday. Went to urgent care last night and sent to the ER for further evaluation given positive cremasteric reflex on exam. Baseline functional status is ambulates with cane and independent with all ADLs. Lives at home with wife and daughter.    In the ED, VSS except Tmax 99.1. WBC 14.45K, Cr 1.76, blood glucose 209. Lactic acid 3.3. U/S testicles with left epididymoorchitis with large complex left hydrocele/pyocele. CT A/P without acute abnormality detected or evidence of bowel obstruction but with mild constipation. Received 1L NS bolus, ceftriaxone 1 g IV, and morphine 4 mg IV. ER physician contacted urology to evaluate patient. Fredi Rene is a 75 year old male with PMHx of HTN, NIDDM2, hypothyroidism, and BPH who presented to the ED on 3/24/25 for complaints of left testicular pain and swelling.     Patient is Welsh speaking but declined  services and preferred daughter at bedside to translate. As per daughter at bedside, patient started having left testicular pain and swelling since Friday. Initially intermittent in nature and then became constant. Tried taking tylenol for the pain without alleviation. Severity was 10/10. No associated fevers or chills. Sexually active with wife, last sexual intercourse two months ago. Also having constipation since Friday. Typically has one bowel movement daily but has not had one since Friday. Went to urgent care last night, evaluated by NP there, and sent to the ER for further evaluation given absent cremasteric reflex on exam. Baseline functional status is ambulates with cane and independent with all ADLs. Lives at home with wife and daughter.    In the ED, VSS except Tmax 99.1. WBC 14.45K, Cr 1.76, blood glucose 209. Lactic acid 3.3. U/S testicles with left epididymoorchitis with large complex left hydrocele/pyocele. CT A/P without acute abnormality detected or evidence of bowel obstruction but with mild constipation. Received 1L NS bolus, ceftriaxone 1 g IV, and morphine 4 mg IV. ER physician contacted urology to evaluate patient.

## 2025-03-24 NOTE — PATIENT PROFILE ADULT - NSPROGENOTHERPROVIDER_GEN_A_NUR
Take your medications as prescribed. Follow-up with orthopedic referral given to you for further evaluation of your pectoral muscle strain. You may use a heating pad to help soothe your pain as well. You may take Tylenol on top of the medications given to you for further pain control. Return if you develop any new or worsening symptoms.
none

## 2025-03-24 NOTE — H&P ADULT - TIME BILLING
coordination of care with ER physician and ER RN, reviewing note from urgent care, obtaining history, performing a physical examination, reviewing and interpreting labs and imaging, ordering further studies and tests, explaining the diagnosis and treatment plan to patient and daughter at bedside, completing medication reconciliation, and documentation as above.

## 2025-03-24 NOTE — CONSULT NOTE ADULT - SUBJECTIVE AND OBJECTIVE BOX
HPI:  Fredi Rene is a 75 year old male with PMHx of HTN, NIDDM2, hypothyroidism, and BPH who presented to the ED on 3/24/25 for complaints of left testicular pain and swelling.     Patient is Danish speaking but declined  services and preferred daughter at bedside to translate. As per daughter at bedside, patient started having left testicular pain and swelling since Friday. Initially intermittent in nature and then became constant. Tried taking tylenol for the pain without alleviation. Severity was 10/10. No associated fevers or chills. Sexually active with wife, last sexual intercourse two months ago. Also having constipation since Friday. Typically has one bowel movement daily but has not had one since Friday. Went to urgent care last night, evaluated by NP there, and sent to the ER for further evaluation given absent cremasteric reflex on exam. Baseline functional status is ambulates with cane and independent with all ADLs. Lives at home with wife and daughter.    In the ED, VSS except Tmax 99.1. WBC 14.45K, Cr 1.76, blood glucose 209. Lactic acid 3.3. U/S testicles with left epididymoorchitis with large complex left hydrocele/pyocele. CT A/P without acute abnormality detected or evidence of bowel obstruction but with mild constipation. Received 1L NS bolus, ceftriaxone 1 g IV, and morphine 4 mg IV. ER physician contacted urology to evaluate patient. (24 Mar 2025 04:23)      PAST MEDICAL & SURGICAL HISTORY:  HTN (hypertension)      Diabetes mellitus type II, non insulin dependent      BPH (benign prostatic hyperplasia)      Hypothyroidism          Allergies    No Known Allergies    Intolerances        ANTIMICROBIALS:  cefTRIAXone   IVPB 1000 every 24 hours  doxycycline monohydrate Capsule 100 every 12 hours      OTHER MEDS:  acetaminophen     Tablet .. 650 milliGRAM(s) Oral every 6 hours PRN  aluminum hydroxide/magnesium hydroxide/simethicone Suspension 30 milliLiter(s) Oral every 4 hours PRN  aspirin  chewable 81 milliGRAM(s) Oral daily  dextrose 5%. 1000 milliLiter(s) IV Continuous <Continuous>  dextrose 5%. 1000 milliLiter(s) IV Continuous <Continuous>  dextrose 50% Injectable 25 Gram(s) IV Push once  dextrose 50% Injectable 12.5 Gram(s) IV Push once  dextrose 50% Injectable 25 Gram(s) IV Push once  dextrose Oral Gel 15 Gram(s) Oral once PRN  famotidine    Tablet 20 milliGRAM(s) Oral daily  glucagon  Injectable 1 milliGRAM(s) IntraMuscular once  insulin lispro (ADMELOG) corrective regimen sliding scale   SubCutaneous three times a day before meals  levothyroxine 175 MICROGram(s) Oral daily  melatonin 3 milliGRAM(s) Oral at bedtime PRN  metoprolol succinate  milliGRAM(s) Oral at bedtime  morphine  - Injectable 1 milliGRAM(s) IV Push every 6 hours PRN  ondansetron Injectable 4 milliGRAM(s) IV Push every 8 hours PRN  polyethylene glycol 3350 17 Gram(s) Oral daily  senna 2 Tablet(s) Oral at bedtime  tamsulosin 0.4 milliGRAM(s) Oral at bedtime      SOCIAL HISTORY:    Marital Status:    Occupation:   Lives with:     Substance Use (street drugs):   Tobacco Usage:    Alcohol Usage: Social EtOH    FAMILY HISTORY:      ROS:  Unobtainable because:   All other systems negative     Constitutional: no fever, no chills, no weight loss, no night sweats  Eye: no eye pain, no redness, no vision changes  ENT:  no sore throat, no rhinorrhea  Cardiovascular:  no chest pain, no palpitation  Respiratory:  no SOB, no cough  GI:  no abd pain, no vomiting, no diarrhea  urinary: no dysuria, no hematuria, no flank pain  : no  discharge or bleeding  musculoskeletal:  no joint pain, no joint swelling  skin:  no rash  neurology:  no headache, no seizure, no change in mental status  psych: no anxiety, no depression     Physical Exam:    General:    NAD, non toxic  Head: atraumatic, normocephalic  Eyes: normal sclera and conjunctiva  ENT:   no oropharyngeal lesions, no LAD, neck supple  Cardio:    regular S1,S2, no murmur  Respiratory:   clear to auscultation b/l, no wheezing  abd:   soft, BS +, not tender, no hepatosplenomegaly  :     no CVAT, no suprapubic tenderness, no smith  Musculoskeletal : no joint swelling, no edema  Skin:    no rash  vascular:  normal pulses  Neurologic:     no focal deficits  psych: normal affect, no suicidal ideation      Drug Dosing Weight  Height (cm): 170.2 (24 Mar 2025 08:10)  Weight (kg): 73.5 (24 Mar 2025 08:10)  BMI (kg/m2): 25.4 (24 Mar 2025 08:10)  BSA (m2): 1.85 (24 Mar 2025 08:10)    Vital Signs Last 24 Hrs  T(F): 98.3 (03-24-25 @ 10:42), Max: 99.1 (03-24-25 @ 01:29)    Vital Signs Last 24 Hrs  HR: 66 (03-24-25 @ 10:42) (66 - 76)  BP: 150/77 (03-24-25 @ 10:42) (106/60 - 167/79)  RR: 17 (03-24-25 @ 10:42)  SpO2: 96% (03-24-25 @ 10:42) (95% - 96%)  Wt(kg): --                          14.3   13.55 )-----------( 256      ( 24 Mar 2025 09:30 )             44.2       03-24    135  |  105  |  18  ----------------------------<  216[H]  4.1   |  26  |  1.31[H]    Ca    9.6      24 Mar 2025 09:30    TPro  8.0  /  Alb  3.1[L]  /  TBili  0.4  /  DBili  x   /  AST  18  /  ALT  21  /  AlkPhos  83  03-24      Urinalysis Basic - ( 24 Mar 2025 09:30 )    Color: x / Appearance: x / SG: x / pH: x  Gluc: 216 mg/dL / Ketone: x  / Bili: x / Urobili: x   Blood: x / Protein: x / Nitrite: x   Leuk Esterase: x / RBC: x / WBC x   Sq Epi: x / Non Sq Epi: x / Bacteria: x        MICROBIOLOGY:  v              RADIOLOGY:       HPI:  Patient  is a 75 year old male with PMHx of HTN, NIDDM2, hypothyroidism, and BPH who presented to the ED on 3/24/25 for complaints of left testicular pain and swelling.     patient states started having left testicular pain and swelling for past 4 days  as per chart notes-  Initially intermittent in nature and then became constant. Tried taking tylenol for the pain without alleviation. Severity was 10/10. No associated fevers or chills. Sexually active with wife, last sexual intercourse two months ago. Also having constipation since Friday. Typically has one bowel movement daily but has not had one since Friday. Went to urgent care last night, evaluated by NP there, and sent to the ER for further evaluation given absent cremasteric reflex on exam. Baseline functional status is ambulates with cane and independent with all ADLs. Lives at home with wife and daughter.    In the ED, VSS except Tmax 99.1. WBC 14.45K, Cr 1.76, blood glucose 209. Lactic acid 3.3. U/S testicles with left epididymoorchitis with large complex left hydrocele/pyocele. CT A/P without acute abnormality detected or evidence of bowel obstruction but with mild constipation. Received 1L NS bolus, ceftriaxone 1 g IV, and morphine 4 mg IV. ER physician contacted urology to evaluate patient. (24 Mar 2025 04:23)    Infectious Disease consultation requested today to help with abx management     patient seen and examined along with his nurse present at bedside    patient states he has pain and swelling in left testicle and that it started 4 days ago  he states he had something like this 25 years ago and  it had to be drained and then it resolved   he denies any dysurea but states sometimes has difficulty urinating  he denies any fever  denies any abd or flank pain  denies any nausea        PAST MEDICAL & SURGICAL HISTORY:  HTN (hypertension)      Diabetes mellitus type II, non insulin dependent      BPH (benign prostatic hyperplasia)      Hypothyroidism          Allergies    No Known Drug Allergies            ANTIMICROBIALS:  cefTRIAXone   IVPB 1000 every 24 hours  doxycycline monohydrate Capsule 100 every 12 hours      OTHER MEDS:  acetaminophen     Tablet .. 650 milliGRAM(s) Oral every 6 hours PRN  aluminum hydroxide/magnesium hydroxide/simethicone Suspension 30 milliLiter(s) Oral every 4 hours PRN  aspirin  chewable 81 milliGRAM(s) Oral daily  dextrose 5%. 1000 milliLiter(s) IV Continuous <Continuous>  dextrose 5%. 1000 milliLiter(s) IV Continuous <Continuous>  dextrose 50% Injectable 25 Gram(s) IV Push once  dextrose 50% Injectable 12.5 Gram(s) IV Push once  dextrose 50% Injectable 25 Gram(s) IV Push once  dextrose Oral Gel 15 Gram(s) Oral once PRN  famotidine    Tablet 20 milliGRAM(s) Oral daily  glucagon  Injectable 1 milliGRAM(s) IntraMuscular once  insulin lispro (ADMELOG) corrective regimen sliding scale   SubCutaneous three times a day before meals  levothyroxine 175 MICROGram(s) Oral daily  melatonin 3 milliGRAM(s) Oral at bedtime PRN  metoprolol succinate  milliGRAM(s) Oral at bedtime  morphine  - Injectable 1 milliGRAM(s) IV Push every 6 hours PRN  ondansetron Injectable 4 milliGRAM(s) IV Push every 8 hours PRN  polyethylene glycol 3350 17 Gram(s) Oral daily  senna 2 Tablet(s) Oral at bedtime  tamsulosin 0.4 milliGRAM(s) Oral at bedtime      SOCIAL HISTORY:      Lives with: wife        ROS:  Unobtainable because:   All other systems negative     Constitutional: no fever, no chills, no weight loss, no night sweats  Eye: no eye pain, no redness, no vision changes  ENT:  no sore throat, no rhinorrhea  Cardiovascular:  no chest pain, no palpitation  Respiratory:  no SOB, no cough  GI:  no abd pain, no vomiting, no diarrhea  urinary: no dysuria, no hematuria, no flank pain  :left testicular swelling and pain  musculoskeletal:  no joint pain, no joint swelling  skin:  no rash  neurology:  no headache    Physical Exam:    General:    NAD, non toxic  Head: atraumatic, normocephalic  Eyes: normal sclera and conjunctiva  ENT:   no oropharyngeal lesions, no LAD, neck supple  Cardio:    regular S1,S2  Respiratory:   clear to auscultation b/l, no wheezing  abd:   soft, BS +, not tender, no distention   :     no CVAT, no suprapubic tenderness, no smith  left testicle somewhat edematous and tender, no open wounds, no d/c (nurse present at bedside )  Musculoskeletal : no joint swelling, no edema  Skin:    no rash  vascular:  normal pulses  Neurologic:     no focal deficits  psych: normal affect      Drug Dosing Weight  Height (cm): 170.2 (24 Mar 2025 08:10)  Weight (kg): 73.5 (24 Mar 2025 08:10)  BMI (kg/m2): 25.4 (24 Mar 2025 08:10)  BSA (m2): 1.85 (24 Mar 2025 08:10)    Vital Signs Last 24 Hrs  T(F): 98.3 (03-24-25 @ 10:42), Max: 99.1 (03-24-25 @ 01:29)    Vital Signs Last 24 Hrs  HR: 66 (03-24-25 @ 10:42) (66 - 76)  BP: 150/77 (03-24-25 @ 10:42) (106/60 - 167/79)  RR: 17 (03-24-25 @ 10:42)  SpO2: 96% (03-24-25 @ 10:42) (95% - 96%)  Wt(kg): --                          14.3   13.55 )-----------( 256      ( 24 Mar 2025 09:30 )             44.2       03-24    135  |  105  |  18  ----------------------------<  216[H]  4.1   |  26  |  1.31[H]    Ca    9.6      24 Mar 2025 09:30    TPro  8.0  /  Alb  3.1[L]  /  TBili  0.4  /  DBili  x   /  AST  18  /  ALT  21  /  AlkPhos  83  03-24      Urinalysis Basic - ( 24 Mar 2025 09:30 )    Color: x / Appearance: x / SG: x / pH: x  Gluc: 216 mg/dL / Ketone: x  / Bili: x / Urobili: x   Blood: x / Protein: x / Nitrite: x   Leuk Esterase: x / RBC: x / WBC x   Sq Epi: x / Non Sq Epi: x / Bacteria: x        MICROBIOLOGY:    RADIOLOGY:  < from: CT Abdomen and Pelvis w/ IV Cont (03.24.25 @ 03:16) >    ACC: [id number] EXAM:  CT ABDOMEN AND PELVIS IC   ORDERED BY:   JENA LING     PROCEDURE DATE:  03/24/2025          INTERPRETATION:  CLINICAL INFORMATION: Obstruction.    COMPARISON: None.    CONTRAST/COMPLICATIONS:  IV Contrast: Omnipaque 350  90 cc administered   10 cc discarded  Oral Contrast: NONE  .    PROCEDURE:  CT of the Abdomen and Pelvis was performed.  Sagittal and coronal reformats were performed.    FINDINGS:  LOWER CHEST: Mild atelectatic changes. Bibasilar air trapping and   bronchial wall thickening; consider outpatient pulmonology evaluation.    LIVER: Diffuse steatosis. Indeterminate subcentimeter liver hypodensities   are too small to adequately characterize; statistically favored to   represent cysts.  BILE DUCTS: Normal caliber.  GALLBLADDER: Cholelithiasis. 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  SPLEEN: Within normal limits.  PANCREAS: Within normal limits.  ADRENALS: Within normal limits.  KIDNEYS/URETERS: Within normal limits.    BLADDER: Wall thickening may be further assessed with urinalysis and   urine cultures.  REPRODUCTIVE ORGANS: Enlarged prostate gland within    BOWEL: No bowel obstruction. Appendix is normal. Mild constipation.  PERITONEUM/RETROPERITONEUM: Within normal limits.  VESSELS: Atherosclerotic changes. Hemodynamically significant stenosis   suspected involving the proximal segments of both renal arteries; this is   a subjective opinion.  LYMPH NODES: No lymphadenopathy.  ABDOMINAL WALL: Small fat-containing left inguinal hernia.  BONES: Degenerative changes.    IMPRESSION:  No acute abnormality detected. No evidence of bowel obstruction. Mild   constipation.        --- End of Report ---            JOSELUIS BRANDON MD  This document has been electronically signed. Mar 24 2025  3:35AM    < end of copied text >    < from: US Testicles (03.24.25 @ 02:22) >    ACC: [id number] EXAM:  US SCROTUM AND CONTENTS   ORDERED BY:   JENA LING     PROCEDURE DATE:  03/24/2025          INTERPRETATION:  CLINICAL INFORMATION: Rule out torsion    COMPARISON: None available.    TECHNIQUE: Testicular ultrasound utilizing color and spectral Doppler.    FINDINGS:    RIGHT:  Right testis: 4.2 x 2.7 x 1.9 cm. Normal echogenicity and echotexture   with no masses or areas of architectural distortion. Normal arterial and   venous blood flow pattern.  Right epididymis: Within normal limits.  Right hydrocele: None.  Right varicocele: None.  <empty>    LEFT:  Left testis: 2.9 x 2.2 x 2.0 cm. Increased vascularity  Left epididymis: Thickened and heterogeneous with increased vascularity  Left hydrocele: Large complex left hydrocele  Left varicocele: None.  <empty>    IMPRESSION:    Left epididymoorchitis with large complex left hydrocele/pyocele        --- End of Report ---            MAXI RODRIGUES MD  This document has been electronically signed. Mar 24 2025 2:38AM    < end of copied text >

## 2025-03-24 NOTE — ED ADULT TRIAGE NOTE - CHIEF COMPLAINT QUOTE
Patient sent from Urgent care for stat US to r/o Testicular torsion Left testicular. Patient reports 8/10 left testicular pain, Constipation and left knee pain. Denies Nausea or vomiting.  PMH: DM, HTN

## 2025-03-24 NOTE — CONSULT NOTE ADULT - ASSESSMENT
75M Japanese speaking, h/o DM, BPH,   here with painful swollen testicle x 3 days, worsening over last 24h  Afebrile, + leukocytosis  US shows Left epididymoorchitis with large complex left hydrocele/pyocele  BRENDAN- trend Cr, give IVF  on doxycycline, recommend ID consult  Needs PVR  discussed with Dr Mustafa
A/P-  75 year old male with PMHx of HTN, NIDDM2, hypothyroidism, and BPH who presented to the ED on 3/24/25 for complaints of left testicular pain and swelling.     US reported as -  Left epididymoorchitis with large complex left hydrocele/pyocele    Infectious Disease consultation requested today to help with abx management     Afebrile  mild Leukocytosis of 13k  UA- negative for nitrate but pos for LE and wbc and bacteria    Impression-  +leukocytosis  BPH  JEAN CLAUDE -mild  Left epididymoorchitis with large complex left hydrocele/pyocele- patient is >35 years of age and  and hence doubt GC/chlmaydia as the etiology as the etiology of the epididymorchitis, however, will need to rule it out still    Plan-  check urine cx   check urine GC /chlamydia  agree with current abx of doxy and ceftriaxone pending further results.   follow up advised.  rest of the medical management as per medicine  team.    All labs and imaging and chart notes reviewed.     All above discussed with patient and patient verbalizes full understanding of all above and agrees with above plan of care.    Thank you for this consultation.    d/w .    Aileen Oh MD  Infectious Disease Attending    for any questions please do not hesitate to contact me either via teams or by calling [phone]

## 2025-03-24 NOTE — ED PROVIDER NOTE - NS ED ROS FT
General: Denies fever, chills  HEENT: Denies sensory changes, sore throat  Neck: Denies neck pain, neck stiffness  Resp: Denies coughing, SOB  Cardiovascular: Denies CP, palpitations, LE edema  GI: Denies nausea, vomiting, abdominal pain, diarrhea, constipation, blood in stool  : Denies dysuria, hematuria, frequency, incontinence; + testicular pain  MSK: Denies back pain  Neuro: Denies HA, dizziness, numbness, weakness  Skin: Denies rashes.

## 2025-03-25 LAB
ANION GAP SERPL CALC-SCNC: 9 MMOL/L — SIGNIFICANT CHANGE UP (ref 5–17)
BUN SERPL-MCNC: 16 MG/DL — SIGNIFICANT CHANGE UP (ref 7–23)
C TRACH RRNA SPEC QL NAA+PROBE: SIGNIFICANT CHANGE UP
CALCIUM SERPL-MCNC: 9.4 MG/DL — SIGNIFICANT CHANGE UP (ref 8.5–10.1)
CHLORIDE SERPL-SCNC: 103 MMOL/L — SIGNIFICANT CHANGE UP (ref 96–108)
CO2 SERPL-SCNC: 24 MMOL/L — SIGNIFICANT CHANGE UP (ref 22–31)
CREAT SERPL-MCNC: 1.15 MG/DL — SIGNIFICANT CHANGE UP (ref 0.5–1.3)
CULTURE RESULTS: ABNORMAL
EGFR: 66 ML/MIN/1.73M2 — SIGNIFICANT CHANGE UP
EGFR: 66 ML/MIN/1.73M2 — SIGNIFICANT CHANGE UP
GLUCOSE BLDC GLUCOMTR-MCNC: 132 MG/DL — HIGH (ref 70–99)
GLUCOSE BLDC GLUCOMTR-MCNC: 203 MG/DL — HIGH (ref 70–99)
GLUCOSE BLDC GLUCOMTR-MCNC: 219 MG/DL — HIGH (ref 70–99)
GLUCOSE BLDC GLUCOMTR-MCNC: 221 MG/DL — HIGH (ref 70–99)
GLUCOSE SERPL-MCNC: 142 MG/DL — HIGH (ref 70–99)
HCT VFR BLD CALC: 43.3 % — SIGNIFICANT CHANGE UP (ref 39–50)
HGB BLD-MCNC: 14.2 G/DL — SIGNIFICANT CHANGE UP (ref 13–17)
MCHC RBC-ENTMCNC: 28.3 PG — SIGNIFICANT CHANGE UP (ref 27–34)
MCHC RBC-ENTMCNC: 32.8 G/DL — SIGNIFICANT CHANGE UP (ref 32–36)
MCV RBC AUTO: 86.4 FL — SIGNIFICANT CHANGE UP (ref 80–100)
N GONORRHOEA RRNA SPEC QL NAA+PROBE: SIGNIFICANT CHANGE UP
NRBC BLD AUTO-RTO: 0 /100 WBCS — SIGNIFICANT CHANGE UP (ref 0–0)
PLATELET # BLD AUTO: 245 K/UL — SIGNIFICANT CHANGE UP (ref 150–400)
POTASSIUM SERPL-MCNC: 4 MMOL/L — SIGNIFICANT CHANGE UP (ref 3.5–5.3)
POTASSIUM SERPL-SCNC: 4 MMOL/L — SIGNIFICANT CHANGE UP (ref 3.5–5.3)
RBC # BLD: 5.01 M/UL — SIGNIFICANT CHANGE UP (ref 4.2–5.8)
RBC # FLD: 14.3 % — SIGNIFICANT CHANGE UP (ref 10.3–14.5)
SODIUM SERPL-SCNC: 136 MMOL/L — SIGNIFICANT CHANGE UP (ref 135–145)
SPECIMEN SOURCE: SIGNIFICANT CHANGE UP
WBC # BLD: 13.56 K/UL — HIGH (ref 3.8–10.5)
WBC # FLD AUTO: 13.56 K/UL — HIGH (ref 3.8–10.5)

## 2025-03-25 PROCEDURE — G0545: CPT

## 2025-03-25 PROCEDURE — 99232 SBSQ HOSP IP/OBS MODERATE 35: CPT

## 2025-03-25 PROCEDURE — 99233 SBSQ HOSP IP/OBS HIGH 50: CPT

## 2025-03-25 RX ORDER — SENNA 187 MG
2 TABLET ORAL
Refills: 0 | Status: DISCONTINUED | OUTPATIENT
Start: 2025-03-25 | End: 2025-03-28

## 2025-03-25 RX ORDER — BISACODYL 5 MG
10 TABLET, DELAYED RELEASE (ENTERIC COATED) ORAL DAILY
Refills: 0 | Status: COMPLETED | OUTPATIENT
Start: 2025-03-25 | End: 2025-03-26

## 2025-03-25 RX ORDER — FINASTERIDE 1 MG/1
5 TABLET, FILM COATED ORAL DAILY
Refills: 0 | Status: DISCONTINUED | OUTPATIENT
Start: 2025-03-25 | End: 2025-03-28

## 2025-03-25 RX ORDER — POLYETHYLENE GLYCOL 3350 17 G/17G
17 POWDER, FOR SOLUTION ORAL
Refills: 0 | Status: DISCONTINUED | OUTPATIENT
Start: 2025-03-25 | End: 2025-03-28

## 2025-03-25 RX ADMIN — CEFTRIAXONE 100 MILLIGRAM(S): 500 INJECTION, POWDER, FOR SOLUTION INTRAMUSCULAR; INTRAVENOUS at 05:46

## 2025-03-25 RX ADMIN — Medication 2 TABLET(S): at 18:00

## 2025-03-25 RX ADMIN — Medication 175 MICROGRAM(S): at 05:47

## 2025-03-25 RX ADMIN — INSULIN LISPRO 2: 100 INJECTION, SOLUTION INTRAVENOUS; SUBCUTANEOUS at 11:45

## 2025-03-25 RX ADMIN — FINASTERIDE 5 MILLIGRAM(S): 1 TABLET, FILM COATED ORAL at 22:02

## 2025-03-25 RX ADMIN — Medication 10 MILLIGRAM(S): at 12:01

## 2025-03-25 RX ADMIN — POLYETHYLENE GLYCOL 3350 17 GRAM(S): 17 POWDER, FOR SOLUTION ORAL at 18:01

## 2025-03-25 RX ADMIN — METOPROLOL SUCCINATE 100 MILLIGRAM(S): 50 TABLET, EXTENDED RELEASE ORAL at 22:03

## 2025-03-25 RX ADMIN — TAMSULOSIN HYDROCHLORIDE 0.4 MILLIGRAM(S): 0.4 CAPSULE ORAL at 22:03

## 2025-03-25 RX ADMIN — INSULIN LISPRO 2: 100 INJECTION, SOLUTION INTRAVENOUS; SUBCUTANEOUS at 07:59

## 2025-03-25 RX ADMIN — Medication 81 MILLIGRAM(S): at 12:00

## 2025-03-25 RX ADMIN — Medication 100 MILLIGRAM(S): at 18:01

## 2025-03-25 RX ADMIN — Medication 100 MILLIGRAM(S): at 05:47

## 2025-03-25 RX ADMIN — Medication 20 MILLIGRAM(S): at 12:00

## 2025-03-25 NOTE — PROGRESS NOTE ADULT - SUBJECTIVE AND OBJECTIVE BOX
Patient seen and examined bedside resting comfortably.  Patient endorses L testicular pain, the same as yesterday.  Denies nausea and vomiting. Tolerating diet.  Denies chest pain, dyspnea, cough.    T(F): 97.8 (03-25-25 @ 05:22), Max: 98.4 (03-24-25 @ 17:11)  HR: 82 (03-25-25 @ 05:22) (66 - 82)  BP: 114/72 (03-25-25 @ 05:22) (114/72 - 150/77)  RR: 17 (03-25-25 @ 05:22) (17 - 19)  SpO2: 95% (03-25-25 @ 05:44) (93% - 96%)  Wt(kg): --  CAPILLARY BLOOD GLUCOSE      POCT Blood Glucose.: 221 mg/dL (25 Mar 2025 07:40)  POCT Blood Glucose.: 129 mg/dL (24 Mar 2025 21:40)  POCT Blood Glucose.: 212 mg/dL (24 Mar 2025 16:16)  POCT Blood Glucose.: 188 mg/dL (24 Mar 2025 11:04)      PHYSICAL EXAM:  General: NAD, alert and awake  HEENT: NCAT, EOMI, conjunctiva clear  Chest: nonlabored respirations, good inspiratory effort  Abdomen: soft, NTND.   Extremities: no pedal edema or calf tenderness noted   : 16Fr smith in place, draining clear yellow urine, output 1350cc since placement. No suprapubic tenderness or bladder distention. L testicle edematous, firm, +TTP, no overlying skin changes, no area of fluctuance, no subcutaneous crepitus. R testicle WNL.     LABS:                        14.2   13.56 )-----------( 245      ( 25 Mar 2025 06:30 )             43.3   03-25    136  |  103  |  16  ----------------------------<  142[H]  4.0   |  24  |  1.15    Ca    9.4      25 Mar 2025 06:30    TPro  8.0  /  Alb  3.1[L]  /  TBili  0.4  /  DBili  x   /  AST  18  /  ALT  21  /  AlkPhos  83  03-24    I&O's Detail    24 Mar 2025 07:01  -  25 Mar 2025 07:00  --------------------------------------------------------  IN:    IV PiggyBack: 50 mL    Oral Fluid: 1074 mL  Total IN: 1124 mL    OUT:    Indwelling Catheter - Urethral (mL): 1350 mL    Voided (mL): 625 mL  Total OUT: 1975 mL    Total NET: -851 mL          Impression: 75y Male h/o DM, BPH with left epididymoorchitis   Afebrile, with leukocytosis, WBC 13.55  Cr normalized, 1.15 today from 1.31 yesterday  Smith catheter in place, draining clear yellow urine, output 1350cc since placement yesterday  A1c 8.2    Plan:  - Continue antibiotics per ID  - Follow up urine culture  - Continue smith catheter, monitor urine output   - Scrotal elevation  - Continue care per primary team  - Will discuss with urology attending   Patient seen and examined bedside resting comfortably.  Patient endorses L testicular pain, the same as yesterday.  Denies nausea and vomiting. Tolerating diet.  Denies chest pain, dyspnea, cough.    T(F): 97.8 (03-25-25 @ 05:22), Max: 98.4 (03-24-25 @ 17:11)  HR: 82 (03-25-25 @ 05:22) (66 - 82)  BP: 114/72 (03-25-25 @ 05:22) (114/72 - 150/77)  RR: 17 (03-25-25 @ 05:22) (17 - 19)  SpO2: 95% (03-25-25 @ 05:44) (93% - 96%)  Wt(kg): --  CAPILLARY BLOOD GLUCOSE      POCT Blood Glucose.: 221 mg/dL (25 Mar 2025 07:40)  POCT Blood Glucose.: 129 mg/dL (24 Mar 2025 21:40)  POCT Blood Glucose.: 212 mg/dL (24 Mar 2025 16:16)  POCT Blood Glucose.: 188 mg/dL (24 Mar 2025 11:04)      PHYSICAL EXAM:  General: NAD, alert and awake  HEENT: NCAT, EOMI, conjunctiva clear  Chest: nonlabored respirations, good inspiratory effort  Abdomen: soft, NTND.   Extremities: no pedal edema or calf tenderness noted   : 16Fr smith in place, draining clear yellow urine, output 1350cc since placement. No suprapubic tenderness or bladder distention. L testicle edematous, firm, +TTP, no overlying skin changes, no area of fluctuance, no subcutaneous crepitus. R testicle WNL.     LABS:                        14.2   13.56 )-----------( 245      ( 25 Mar 2025 06:30 )             43.3   03-25    136  |  103  |  16  ----------------------------<  142[H]  4.0   |  24  |  1.15    Ca    9.4      25 Mar 2025 06:30    TPro  8.0  /  Alb  3.1[L]  /  TBili  0.4  /  DBili  x   /  AST  18  /  ALT  21  /  AlkPhos  83  03-24    I&O's Detail    24 Mar 2025 07:01  -  25 Mar 2025 07:00  --------------------------------------------------------  IN:    IV PiggyBack: 50 mL    Oral Fluid: 1074 mL  Total IN: 1124 mL    OUT:    Indwelling Catheter - Urethral (mL): 1350 mL    Voided (mL): 625 mL  Total OUT: 1975 mL    Total NET: -851 mL          Impression: 75y Male h/o DM, BPH with left epididymoorchitis   Afebrile, with leukocytosis, WBC 13.55  Cr normalized, 1.15 today from 1.31 yesterday  Smith catheter in place, draining clear yellow urine, output 1350cc since placement yesterday  A1c 8.2    Plan as discussed with Dr. Mustafa:  - Continue antibiotics per ID  - Follow up urine culture  - Continue smith catheter, monitor urine output   - Scrotal elevation  - Continue care per primary team

## 2025-03-25 NOTE — PROGRESS NOTE ADULT - SUBJECTIVE AND OBJECTIVE BOX
LAUREN JEFFERY  MRN-[id number]  75y (1950)    Follow Up:  left epididymoorchitis, uti     Interval History: The pt was seen and examined earlier, not in acute distress, continues to complain of discomfort at his scrotal area. Pt is afebrile, RA, leukocytosis is about the same 13.56.  PAST MEDICAL & SURGICAL HISTORY:  HTN (hypertension)      Diabetes mellitus type II, non insulin dependent      BPH (benign prostatic hyperplasia)      Hypothyroidism          ROS:    [ ] Unobtainable because:  [x ] All other systems negative    Constitutional: no fever, no chills  Head: no trauma  Eyes: no vision changes, no eye pain  ENT:  no sore throat, no rhinorrhea  Cardiovascular:  no chest pain, no palpitation  Respiratory:  no SOB, no cough  GI:  no abd pain, no vomiting, no diarrhea  urinary: scrotal swelling and tenderness   musculoskeletal:  no joint pain, no joint swelling  skin:  no rash  neurology:  no headache, no seizure, no change in mental status  psych: no anxiety, no depression         Allergies  No Known Allergies        ANTIMICROBIALS:  cefTRIAXone   IVPB 2000 every 24 hours  doxycycline monohydrate Capsule 100 every 12 hours      OTHER MEDS:  acetaminophen     Tablet .. 650 milliGRAM(s) Oral every 6 hours PRN  aluminum hydroxide/magnesium hydroxide/simethicone Suspension 30 milliLiter(s) Oral every 4 hours PRN  aspirin  chewable 81 milliGRAM(s) Oral daily  bisacodyl Suppository 10 milliGRAM(s) Rectal daily  dextrose 5%. 1000 milliLiter(s) IV Continuous <Continuous>  dextrose 5%. 1000 milliLiter(s) IV Continuous <Continuous>  dextrose 50% Injectable 25 Gram(s) IV Push once  dextrose 50% Injectable 12.5 Gram(s) IV Push once  dextrose 50% Injectable 25 Gram(s) IV Push once  dextrose Oral Gel 15 Gram(s) Oral once PRN  famotidine    Tablet 20 milliGRAM(s) Oral daily  glucagon  Injectable 1 milliGRAM(s) IntraMuscular once  insulin lispro (ADMELOG) corrective regimen sliding scale   SubCutaneous three times a day before meals  levothyroxine 175 MICROGram(s) Oral daily  melatonin 3 milliGRAM(s) Oral at bedtime PRN  metoprolol succinate  milliGRAM(s) Oral at bedtime  ondansetron Injectable 4 milliGRAM(s) IV Push every 8 hours PRN  polyethylene glycol 3350 17 Gram(s) Oral two times a day  senna 2 Tablet(s) Oral two times a day  tamsulosin 0.4 milliGRAM(s) Oral at bedtime      Vital Signs Last 24 Hrs  T(C): 36.6 (25 Mar 2025 10:42), Max: 36.9 (24 Mar 2025 17:11)  T(F): 97.9 (25 Mar 2025 10:42), Max: 98.4 (24 Mar 2025 17:11)  HR: 75 (25 Mar 2025 10:42) (69 - 82)  BP: 132/71 (25 Mar 2025 10:42) (114/72 - 132/71)  BP(mean): --  RR: 18 (25 Mar 2025 10:42) (17 - 19)  SpO2: 94% (25 Mar 2025 10:42) (93% - 95%)    Parameters below as of 25 Mar 2025 10:42  Patient On (Oxygen Delivery Method): room air        Physical Exam:  General:    NAD, non toxic, RA  Head: atraumatic, normocephalic  Eyes: normal sclera and conjunctiva  ENT:   no oropharyngeal lesions, no LAD, neck supple  Cardio:    regular S1,S2  Respiratory:   clear to auscultation b/l, no wheezing  abd:   soft, BS +, not tender, no distention   :     no CVAT, no suprapubic tenderness, + smith, left testicle somewhat edematous and tender, no open wounds, no discharge   Musculoskeletal : no joint swelling, no edema  Skin:    no rash  vascular:  normal pulses  Neurologic:     no focal deficits  psych: normal affect    WBC Count: 13.56 K/uL (03-25 @ 06:30)  WBC Count: 13.55 K/uL (03-24 @ 09:30)  WBC Count: 14.45 K/uL (03-24 @ 02:00)                            14.2   13.56 )-----------( 245      ( 25 Mar 2025 06:30 )             43.3       03-25    136  |  103  |  16  ----------------------------<  142[H]  4.0   |  24  |  1.15    Ca    9.4      25 Mar 2025 06:30    TPro  8.0  /  Alb  3.1[L]  /  TBili  0.4  /  DBili  x   /  AST  18  /  ALT  21  /  AlkPhos  83  03-24      Urinalysis Basic - ( 25 Mar 2025 06:30 )    Color: x / Appearance: x / SG: x / pH: x  Gluc: 142 mg/dL / Ketone: x  / Bili: x / Urobili: x   Blood: x / Protein: x / Nitrite: x   Leuk Esterase: x / RBC: x / WBC x   Sq Epi: x / Non Sq Epi: x / Bacteria: x        Creatinine Trend: 1.15<--, 1.31<--, 1.76<--  Lactate, Blood: 1.5 mmol/L (03-24-25 @ 09:30)  Lactate, Blood: 3.3 mmol/L (03-24-25 @ 02:00)      MICROBIOLOGY:  v  Clean Catch  03-24-25   Culture positive, >100,000 CFU/ml . Identification to follow.  --  --      RADIOLOGY:    < from: CT Abdomen and Pelvis w/ IV Cont (03.24.25 @ 03:16) >    ACC: [id number] EXAM:  CT ABDOMEN AND PELVIS IC   ORDERED BY:   JENA LING     PROCEDURE DATE:  03/24/2025          INTERPRETATION:  CLINICAL INFORMATION: Obstruction.    COMPARISON: None.    CONTRAST/COMPLICATIONS:  IV Contrast: Omnipaque 350  90 cc administered   10 cc discarded  Oral Contrast: NONE  .    PROCEDURE:  CT of the Abdomen and Pelvis was performed.  Sagittal and coronal reformats were performed.    FINDINGS:  LOWER CHEST: Mild atelectatic changes. Bibasilar air trapping and   bronchial wall thickening; consider outpatient pulmonology evaluation.    LIVER: Diffuse steatosis. Indeterminate subcentimeter liver hypodensities   are too small to adequately characterize; statistically favored to   represent cysts.  BILE DUCTS: Normal caliber.  GALLBLADDER: Cholelithiasis. 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  SPLEEN: Within normal limits.  PANCREAS: Within normal limits.  ADRENALS: Within normal limits.  KIDNEYS/URETERS: Within normal limits.    BLADDER: Wall thickening may be further assessed with urinalysis and   urine cultures.  REPRODUCTIVE ORGANS: Enlarged prostate gland within    BOWEL: No bowel obstruction. Appendix is normal. Mild constipation.  PERITONEUM/RETROPERITONEUM: Within normal limits.  VESSELS: Atherosclerotic changes. Hemodynamically significant stenosis   suspected involving the proximal segments of both renal arteries; this is   a subjective opinion.  LYMPH NODES: No lymphadenopathy.  ABDOMINAL WALL: Small fat-containing left inguinal hernia.  BONES: Degenerative changes.    IMPRESSION:  No acute abnormality detected. No evidence of bowel obstruction. Mild   constipation.        --- End of Report ---            JOSELUIS BRANDON MD  This document has been electronically signed. Mar 24 2025  3:35AM    < end of copied text >

## 2025-03-25 NOTE — PROGRESS NOTE ADULT - ASSESSMENT
A/P-  75 year old male with PMHx of HTN, NIDDM2, hypothyroidism, and BPH who presented to the ED on 3/24/25 for complaints of left testicular pain and swelling.     US reported as -  Left epididymoorchitis with large complex left hydrocele/pyocele    Infectious Disease consultation requested today to help with abx management     Afebrile  mild Leukocytosis of 13k  UA- negative for nitrate but pos for LE and wbc and bacteria    Impression-  +leukocytosis  BPH  JEAN CLAUDE -mild  Left epididymoorchitis with large complex left hydrocele/pyocele- patient is >34 years of age and  and hence doubt GC/chlmaydia as the etiology as the etiology of the epididymorchitis, however, will need to rule it out still    Plan-  check urine cx   check urine GC /chlamydia  agree with current abx of doxy and ceftriaxone pending further results.   follow up advised.  rest of the medical management as per medicine  team.    All labs and imaging and chart notes reviewed.     All above discussed with patient and patient verbalizes full understanding of all above and agrees with above plan of care.    Thank you for this consultation.    d/w .    Aileen Oh MD  Infectious Disease Attending    for any questions please do not hesitate to contact me either via teams or by calling [phone]

## 2025-03-25 NOTE — PROGRESS NOTE ADULT - SUBJECTIVE AND OBJECTIVE BOX
LAUREN JEFFEYR  MRN-[id number]  75y (1950)    Follow Up:      Interval History: The pt was seen and examined earlier, not in acute distress, no new complaints. Pt is afebrile, RA, no WBC.     PAST MEDICAL & SURGICAL HISTORY:  HTN (hypertension)      Diabetes mellitus type II, non insulin dependent      BPH (benign prostatic hyperplasia)      Hypothyroidism          ROS:    [ ] Unobtainable because:  [ ] All other systems negative    Constitutional: no fever, no chills  Head: no trauma  Eyes: no vision changes, no eye pain  ENT:  no sore throat, no rhinorrhea  Cardiovascular:  no chest pain, no palpitation  Respiratory:  no SOB, no cough  GI:  no abd pain, no vomiting, no diarrhea  urinary: no dysuria, no hematuria, no flank pain  musculoskeletal:  no joint pain, no joint swelling  skin:  no rash  neurology:  no headache, no seizure, no change in mental status  psych: no anxiety, no depression         Allergies  No Known Allergies        ANTIMICROBIALS:  cefTRIAXone   IVPB 2000 every 24 hours  doxycycline monohydrate Capsule 100 every 12 hours      OTHER MEDS:  acetaminophen     Tablet .. 650 milliGRAM(s) Oral every 6 hours PRN  aluminum hydroxide/magnesium hydroxide/simethicone Suspension 30 milliLiter(s) Oral every 4 hours PRN  aspirin  chewable 81 milliGRAM(s) Oral daily  bisacodyl Suppository 10 milliGRAM(s) Rectal daily  dextrose 5%. 1000 milliLiter(s) IV Continuous <Continuous>  dextrose 5%. 1000 milliLiter(s) IV Continuous <Continuous>  dextrose 50% Injectable 25 Gram(s) IV Push once  dextrose 50% Injectable 12.5 Gram(s) IV Push once  dextrose 50% Injectable 25 Gram(s) IV Push once  dextrose Oral Gel 15 Gram(s) Oral once PRN  famotidine    Tablet 20 milliGRAM(s) Oral daily  glucagon  Injectable 1 milliGRAM(s) IntraMuscular once  insulin lispro (ADMELOG) corrective regimen sliding scale   SubCutaneous three times a day before meals  levothyroxine 175 MICROGram(s) Oral daily  melatonin 3 milliGRAM(s) Oral at bedtime PRN  metoprolol succinate  milliGRAM(s) Oral at bedtime  ondansetron Injectable 4 milliGRAM(s) IV Push every 8 hours PRN  polyethylene glycol 3350 17 Gram(s) Oral two times a day  senna 2 Tablet(s) Oral two times a day  tamsulosin 0.4 milliGRAM(s) Oral at bedtime      Vital Signs Last 24 Hrs  T(C): 36.6 (25 Mar 2025 10:42), Max: 36.9 (24 Mar 2025 17:11)  T(F): 97.9 (25 Mar 2025 10:42), Max: 98.4 (24 Mar 2025 17:11)  HR: 75 (25 Mar 2025 10:42) (69 - 82)  BP: 132/71 (25 Mar 2025 10:42) (114/72 - 132/71)  BP(mean): --  RR: 18 (25 Mar 2025 10:42) (17 - 19)  SpO2: 94% (25 Mar 2025 10:42) (93% - 95%)    Parameters below as of 25 Mar 2025 10:42  Patient On (Oxygen Delivery Method): room air        Physical Exam:  General:    NAD, non toxic  Head: atraumatic, normocephalic  Eyes: normal sclera and conjunctiva  ENT:   no oropharyngeal lesions, no LAD, neck supple  Cardio:    regular S1,S2  Respiratory:   clear to auscultation b/l, no wheezing  abd:   soft, BS +, not tender, no distention   :     no CVAT, no suprapubic tenderness, no smith  left testicle somewhat edematous and tender, no open wounds, no d/c (nurse present at bedside )  Musculoskeletal : no joint swelling, no edema  Skin:    no rash  vascular:  normal pulses  Neurologic:     no focal deficits  psych: normal affect      WBC Count: 13.56 K/uL (03-25 @ 06:30)  WBC Count: 13.55 K/uL (03-24 @ 09:30)  WBC Count: 14.45 K/uL (03-24 @ 02:00)                            14.2   13.56 )-----------( 245      ( 25 Mar 2025 06:30 )             43.3       03-25    136  |  103  |  16  ----------------------------<  142[H]  4.0   |  24  |  1.15    Ca    9.4      25 Mar 2025 06:30    TPro  8.0  /  Alb  3.1[L]  /  TBili  0.4  /  DBili  x   /  AST  18  /  ALT  21  /  AlkPhos  83  03-24      Urinalysis Basic - ( 25 Mar 2025 06:30 )    Color: x / Appearance: x / SG: x / pH: x  Gluc: 142 mg/dL / Ketone: x  / Bili: x / Urobili: x   Blood: x / Protein: x / Nitrite: x   Leuk Esterase: x / RBC: x / WBC x   Sq Epi: x / Non Sq Epi: x / Bacteria: x        Creatinine Trend: 1.15<--, 1.31<--, 1.76<--  Lactate, Blood: 1.5 mmol/L (03-24-25 @ 09:30)  Lactate, Blood: 3.3 mmol/L (03-24-25 @ 02:00)      MICROBIOLOGY:  v  Clean Catch  03-24-25   Culture positive, >100,000 CFU/ml . Identification to follow.  --  --      RADIOLOGY:

## 2025-03-25 NOTE — PROGRESS NOTE ADULT - SUBJECTIVE AND OBJECTIVE BOX
Patient is a 75y old  Male who presents with a chief complaint of Left epididymoorchitis with large complex left hydrocele/pyocele (25 Mar 2025 09:09)      INTERVAL HPI/OVERNIGHT EVENTS: No acute events overnight.     MEDICATIONS  (STANDING):  aspirin  chewable 81 milliGRAM(s) Oral daily  bisacodyl Suppository 10 milliGRAM(s) Rectal daily  cefTRIAXone   IVPB 2000 milliGRAM(s) IV Intermittent every 24 hours  dextrose 5%. 1000 milliLiter(s) (100 mL/Hr) IV Continuous <Continuous>  dextrose 5%. 1000 milliLiter(s) (50 mL/Hr) IV Continuous <Continuous>  dextrose 50% Injectable 25 Gram(s) IV Push once  dextrose 50% Injectable 12.5 Gram(s) IV Push once  dextrose 50% Injectable 25 Gram(s) IV Push once  doxycycline monohydrate Capsule 100 milliGRAM(s) Oral every 12 hours  famotidine    Tablet 20 milliGRAM(s) Oral daily  glucagon  Injectable 1 milliGRAM(s) IntraMuscular once  insulin lispro (ADMELOG) corrective regimen sliding scale   SubCutaneous three times a day before meals  levothyroxine 175 MICROGram(s) Oral daily  metoprolol succinate  milliGRAM(s) Oral at bedtime  polyethylene glycol 3350 17 Gram(s) Oral two times a day  senna 2 Tablet(s) Oral two times a day  tamsulosin 0.4 milliGRAM(s) Oral at bedtime    MEDICATIONS  (PRN):  acetaminophen     Tablet .. 650 milliGRAM(s) Oral every 6 hours PRN Temp greater or equal to 38C (100.4F), Mild Pain (1 - 3)  aluminum hydroxide/magnesium hydroxide/simethicone Suspension 30 milliLiter(s) Oral every 4 hours PRN Dyspepsia  dextrose Oral Gel 15 Gram(s) Oral once PRN Blood Glucose LESS THAN 70 milliGRAM(s)/deciliter  melatonin 3 milliGRAM(s) Oral at bedtime PRN Insomnia  ondansetron Injectable 4 milliGRAM(s) IV Push every 8 hours PRN Nausea and/or Vomiting      Allergies    No Known Allergies    Intolerances        REVIEW OF SYSTEMS:  CONSTITUTIONAL: +ve for fatigue  EYES: No eye pain, visual disturbances, or discharge  ENMT:  No difficulty hearing, tinnitus, vertigo; No sinus or throat pain  NECK: No pain or stiffness      Vital Signs Last 24 Hrs  T(C): 36.6 (25 Mar 2025 10:42), Max: 36.9 (24 Mar 2025 17:11)  T(F): 97.9 (25 Mar 2025 10:42), Max: 98.4 (24 Mar 2025 17:11)  HR: 75 (25 Mar 2025 10:42) (69 - 82)  BP: 132/71 (25 Mar 2025 10:42) (114/72 - 132/71)  BP(mean): --  RR: 18 (25 Mar 2025 10:42) (17 - 19)  SpO2: 94% (25 Mar 2025 10:42) (93% - 95%)    Parameters below as of 25 Mar 2025 10:42  Patient On (Oxygen Delivery Method): room air        PHYSICAL EXAM:    HEAD:  Atraumatic, Normocephalic  EYES: EOMI, PERRLA, conjunctiva and sclera clear  ENMT: No tonsillar erythema, exudates, or enlargement; Moist mucous membranes, Good dentition, No lesions      LABS:                        14.2   13.56 )-----------( 245      ( 25 Mar 2025 06:30 )             43.3     03-25    136  |  103  |  16  ----------------------------<  142[H]  4.0   |  24  |  1.15    Ca    9.4      25 Mar 2025 06:30    TPro  8.0  /  Alb  3.1[L]  /  TBili  0.4  /  DBili  x   /  AST  18  /  ALT  21  /  AlkPhos  83  03-24      Urinalysis Basic - ( 25 Mar 2025 06:30 )    Color: x / Appearance: x / SG: x / pH: x  Gluc: 142 mg/dL / Ketone: x  / Bili: x / Urobili: x   Blood: x / Protein: x / Nitrite: x   Leuk Esterase: x / RBC: x / WBC x   Sq Epi: x / Non Sq Epi: x / Bacteria: x      CAPILLARY BLOOD GLUCOSE      POCT Blood Glucose.: 203 mg/dL (25 Mar 2025 11:09)  POCT Blood Glucose.: 221 mg/dL (25 Mar 2025 07:40)  POCT Blood Glucose.: 129 mg/dL (24 Mar 2025 21:40)  POCT Blood Glucose.: 212 mg/dL (24 Mar 2025 16:16)

## 2025-03-25 NOTE — PROGRESS NOTE ADULT - ASSESSMENT
A/P-  75 year old male with PMHx of HTN, NIDDM2, hypothyroidism, and BPH who presented to the ED on 3/24/25 for complaints of left testicular pain and swelling.     US reported as -  Left epididymoorchitis with large complex left hydrocele/pyocele    Infectious Disease consultation requested today to help with abx management     Afebrile  mild Leukocytosis of 13k  UA- negative for nitrate but pos for LE and wbc and bacteria    3/25: no fever, RA, leukocytosis without change 13.56, Cr normalized, UC is positive, pending ID,  GC Chlamydia negative, CT a/p - constipation, Bladder wall thickening. Pt's scrotal area with edema and tender to touch, abx continued.      Impression-  +leukocytosis  BPH  JEAN CLAUDE -mild  Left epididymoorchitis with large complex left hydrocele/pyocele- patient is >34 years of age and  and hence doubt GC/chlmaydia as the etiology as the etiology of the epididymorchitis, however, will need to rule it out still    Plan-  continue abx, ceftriaxone IV and doxycycline (day #2)  awaiting for UC ID  urine GC /chlamydia - negative   Urology input is appreciated   Springer management per urology  Scrotal elevation  pain control  rest of the medical management as per medicine  team    discussed with Dr. Oh A/P-  75 year old male with PMHx of HTN, NIDDM2, hypothyroidism, and BPH who presented to the ED on 3/24/25 for complaints of left testicular pain and swelling.     US reported as -  Left epididymoorchitis with large complex left hydrocele/pyocele    Infectious Disease consultation requested today to help with abx management     Afebrile  mild Leukocytosis of 13k  UA- negative for nitrate but pos for LE and wbc and bacteria    3/25: no fever, RA, leukocytosis without change 13.56, Cr normalized, UC is positive, pending ID,  GC Chlamydia negative, CT a/p - constipation, Bladder wall thickening. Pt's scrotal area with edema and tender to touch, abx continued.      Impression-  +leukocytosis  BPH  JEAN CLAUDE -mild  Left epididymoorchitis with large complex left hydrocele/pyocele- patient is >34 years of age and  and hence doubt GC/chlmaydia as the etiology as the etiology of the epididymorchitis, however, will need to rule it out still    Plan-  continue abx, ceftriaxone IV (day #2)  d/c doxy  awaiting for UC ID   urine GC /chlamydia - negative   Urology input is appreciated   Springer management per urology  Scrotal elevation  pain control  rest of the medical management as per medicine  team    discussed with Dr. Oh

## 2025-03-25 NOTE — PROGRESS NOTE ADULT - ASSESSMENT
A/P:    Left epididymoorchitis with large complex left hydrocele/pyocele  On doxy and ceftriaxone  Cultures pending  Gc -ve  ID and urology following  afebrile    BRENDAN  Resolved      Chronic medical conditions:  HTN: PTA metoprolol succinate 100 mg qhs, olmesartan 40 mg held due to BRENDAN  NIDDM2 with hyperglycemia: blood glucose 209 on admission, POC qac and qhs, PTA metformin 500 mg BID held, low dose SSI qac started, blood glucose goal < 180, f/u A1c  Hypothyroidism: PTA levothyroxine 175 mcg  BPH: PTA tamsulosin 0.4 mg    Rashaad Kurtz MD

## 2025-03-26 LAB
AFP-TM SERPL-MCNC: <1.8 NG/ML — SIGNIFICANT CHANGE UP
ANION GAP SERPL CALC-SCNC: 8 MMOL/L — SIGNIFICANT CHANGE UP (ref 5–17)
BUN SERPL-MCNC: 18 MG/DL — SIGNIFICANT CHANGE UP (ref 7–23)
CALCIUM SERPL-MCNC: 9.9 MG/DL — SIGNIFICANT CHANGE UP (ref 8.5–10.1)
CHLORIDE SERPL-SCNC: 105 MMOL/L — SIGNIFICANT CHANGE UP (ref 96–108)
CK MB BLD-MCNC: <2 % — SIGNIFICANT CHANGE UP (ref 0–3.5)
CK MB CFR SERPL CALC: <1 NG/ML — SIGNIFICANT CHANGE UP (ref 0.5–3.6)
CK SERPL-CCNC: 49 U/L — SIGNIFICANT CHANGE UP (ref 26–308)
CO2 SERPL-SCNC: 24 MMOL/L — SIGNIFICANT CHANGE UP (ref 22–31)
CREAT SERPL-MCNC: 1.15 MG/DL — SIGNIFICANT CHANGE UP (ref 0.5–1.3)
EGFR: 66 ML/MIN/1.73M2 — SIGNIFICANT CHANGE UP
EGFR: 66 ML/MIN/1.73M2 — SIGNIFICANT CHANGE UP
GLUCOSE BLDC GLUCOMTR-MCNC: 153 MG/DL — HIGH (ref 70–99)
GLUCOSE BLDC GLUCOMTR-MCNC: 166 MG/DL — HIGH (ref 70–99)
GLUCOSE BLDC GLUCOMTR-MCNC: 222 MG/DL — HIGH (ref 70–99)
GLUCOSE BLDC GLUCOMTR-MCNC: 245 MG/DL — HIGH (ref 70–99)
GLUCOSE SERPL-MCNC: 155 MG/DL — HIGH (ref 70–99)
HCG-TM SERPL-ACNC: <1 MIU/ML — SIGNIFICANT CHANGE UP
HCT VFR BLD CALC: 45.9 % — SIGNIFICANT CHANGE UP (ref 39–50)
HGB BLD-MCNC: 14.9 G/DL — SIGNIFICANT CHANGE UP (ref 13–17)
MCHC RBC-ENTMCNC: 28.3 PG — SIGNIFICANT CHANGE UP (ref 27–34)
MCHC RBC-ENTMCNC: 32.5 G/DL — SIGNIFICANT CHANGE UP (ref 32–36)
MCV RBC AUTO: 87.1 FL — SIGNIFICANT CHANGE UP (ref 80–100)
NRBC BLD AUTO-RTO: 0 /100 WBCS — SIGNIFICANT CHANGE UP (ref 0–0)
PLATELET # BLD AUTO: 290 K/UL — SIGNIFICANT CHANGE UP (ref 150–400)
POTASSIUM SERPL-MCNC: 3.9 MMOL/L — SIGNIFICANT CHANGE UP (ref 3.5–5.3)
POTASSIUM SERPL-SCNC: 3.9 MMOL/L — SIGNIFICANT CHANGE UP (ref 3.5–5.3)
PSA FLD-MCNC: 2.8 NG/ML — SIGNIFICANT CHANGE UP (ref 0–4)
RBC # BLD: 5.27 M/UL — SIGNIFICANT CHANGE UP (ref 4.2–5.8)
RBC # FLD: 14.3 % — SIGNIFICANT CHANGE UP (ref 10.3–14.5)
SODIUM SERPL-SCNC: 137 MMOL/L — SIGNIFICANT CHANGE UP (ref 135–145)
TROPONIN I, HIGH SENSITIVITY RESULT: 4.8 NG/L — SIGNIFICANT CHANGE UP
WBC # BLD: 13.73 K/UL — HIGH (ref 3.8–10.5)
WBC # FLD AUTO: 13.73 K/UL — HIGH (ref 3.8–10.5)

## 2025-03-26 PROCEDURE — 99233 SBSQ HOSP IP/OBS HIGH 50: CPT

## 2025-03-26 PROCEDURE — G0545: CPT

## 2025-03-26 PROCEDURE — 93010 ELECTROCARDIOGRAM REPORT: CPT

## 2025-03-26 PROCEDURE — 99232 SBSQ HOSP IP/OBS MODERATE 35: CPT

## 2025-03-26 RX ORDER — LIDOCAINE HYDROCHLORIDE 20 MG/ML
1 JELLY TOPICAL DAILY
Refills: 0 | Status: DISCONTINUED | OUTPATIENT
Start: 2025-03-26 | End: 2025-03-28

## 2025-03-26 RX ORDER — IBUPROFEN 200 MG
600 TABLET ORAL ONCE
Refills: 0 | Status: COMPLETED | OUTPATIENT
Start: 2025-03-26 | End: 2025-03-26

## 2025-03-26 RX ADMIN — Medication 10 MILLIGRAM(S): at 11:21

## 2025-03-26 RX ADMIN — LIDOCAINE HYDROCHLORIDE 1 PATCH: 20 JELLY TOPICAL at 16:01

## 2025-03-26 RX ADMIN — FINASTERIDE 5 MILLIGRAM(S): 1 TABLET, FILM COATED ORAL at 11:22

## 2025-03-26 RX ADMIN — Medication 30 MILLILITER(S): at 16:00

## 2025-03-26 RX ADMIN — TAMSULOSIN HYDROCHLORIDE 0.4 MILLIGRAM(S): 0.4 CAPSULE ORAL at 21:22

## 2025-03-26 RX ADMIN — Medication 175 MICROGRAM(S): at 05:26

## 2025-03-26 RX ADMIN — CEFTRIAXONE 100 MILLIGRAM(S): 500 INJECTION, POWDER, FOR SOLUTION INTRAMUSCULAR; INTRAVENOUS at 05:25

## 2025-03-26 RX ADMIN — LIDOCAINE HYDROCHLORIDE 1 PATCH: 20 JELLY TOPICAL at 19:58

## 2025-03-26 RX ADMIN — Medication 81 MILLIGRAM(S): at 11:21

## 2025-03-26 RX ADMIN — Medication 20 MILLIGRAM(S): at 11:21

## 2025-03-26 RX ADMIN — INSULIN LISPRO 1: 100 INJECTION, SOLUTION INTRAVENOUS; SUBCUTANEOUS at 08:52

## 2025-03-26 RX ADMIN — Medication 600 MILLIGRAM(S): at 16:00

## 2025-03-26 RX ADMIN — INSULIN LISPRO 2: 100 INJECTION, SOLUTION INTRAVENOUS; SUBCUTANEOUS at 17:22

## 2025-03-26 RX ADMIN — INSULIN LISPRO 2: 100 INJECTION, SOLUTION INTRAVENOUS; SUBCUTANEOUS at 11:26

## 2025-03-26 RX ADMIN — METOPROLOL SUCCINATE 100 MILLIGRAM(S): 50 TABLET, EXTENDED RELEASE ORAL at 21:28

## 2025-03-26 RX ADMIN — Medication 600 MILLIGRAM(S): at 17:00

## 2025-03-26 NOTE — PROGRESS NOTE ADULT - ASSESSMENT
A/P:    Left epididymoorchitis with large complex left hydrocele/pyocele  On ceftriaxone  Cultures growing strep   Gc -ve  ID and urology following  afebrile    BRENDAN  Resolved      Chronic medical conditions:  HTN: PTA metoprolol succinate 100 mg qhs, olmesartan 40 mg held due to BRENDAN  NIDDM2 with hyperglycemia: blood glucose 209 on admission, POC qac and qhs, PTA metformin 500 mg BID held, low dose SSI qac started, blood glucose goal < 180, f/u A1c  Hypothyroidism: PTA levothyroxine 175 mcg  BPH: PTA tamsulosin 0.4 mg    Rashaad Kutrz MD

## 2025-03-26 NOTE — PROGRESS NOTE ADULT - SUBJECTIVE AND OBJECTIVE BOX
[facility]  #[id number]  Patient seen and examined bedside resting comfortably.  complaints of left testicular swelling, stiffness and pain. mildly improved from admission  Denies nausea vomiting, diarrhea, fevers, chills, abd pain.        T(F): 98 (03-26-25 @ 05:14), Max: 98.8 (03-25-25 @ 23:26)  HR: 78 (03-26-25 @ 05:14) (72 - 78)  BP: 100/65 (03-26-25 @ 05:14) (100/65 - 138/72)  RR: 17 (03-26-25 @ 05:14) (16 - 17)  SpO2: 96% (03-26-25 @ 05:14) (95% - 96%)  Wt(kg): --  CAPILLARY BLOOD GLUCOSE      POCT Blood Glucose.: 153 mg/dL (26 Mar 2025 07:54)  POCT Blood Glucose.: 219 mg/dL (25 Mar 2025 21:15)  POCT Blood Glucose.: 132 mg/dL (25 Mar 2025 16:04)  POCT Blood Glucose.: 203 mg/dL (25 Mar 2025 11:09)      PHYSICAL EXAM:  General: NAD, alert and awake  HEENT: NCAT, EOMI, conjunctiva clear  Chest: nonlabored respirations, good inspiratory effort  Abdomen: soft, NTND.   Extremities: no pedal edema or calf tenderness noted   : uncircumcised phallus, adequate meatus with 16 fr cath in place draining cler yellow urine, 2600cc/24hours  left testicle noted swelling and mild TTP. no area of fluctuance    LABS:                        14.9   13.73 )-----------( 290      ( 26 Mar 2025 07:34 )             45.9   03-26    137  |  105  |  18  ----------------------------<  155[H]  3.9   |  24  |  1.15    Ca    9.9      26 Mar 2025 07:34      I&O's Detail    25 Mar 2025 07:01  -  26 Mar 2025 07:00  --------------------------------------------------------  IN:  Total IN: 0 mL    OUT:    Indwelling Catheter - Urethral (mL): 2600 mL  Total OUT: 2600 mL    Total NET: -2600 mL       [facility]  #[id number]  Patient seen and examined bedside resting comfortably.  complaints of left testicular swelling, stiffness and pain. mildly improved from admission  Denies nausea vomiting, diarrhea, fevers, chills, abd pain.        T(F): 98 (03-26-25 @ 05:14), Max: 98.8 (03-25-25 @ 23:26)  HR: 78 (03-26-25 @ 05:14) (72 - 78)  BP: 100/65 (03-26-25 @ 05:14) (100/65 - 138/72)  RR: 17 (03-26-25 @ 05:14) (16 - 17)  SpO2: 96% (03-26-25 @ 05:14) (95% - 96%)  Wt(kg): --  CAPILLARY BLOOD GLUCOSE      POCT Blood Glucose.: 153 mg/dL (26 Mar 2025 07:54)  POCT Blood Glucose.: 219 mg/dL (25 Mar 2025 21:15)  POCT Blood Glucose.: 132 mg/dL (25 Mar 2025 16:04)  POCT Blood Glucose.: 203 mg/dL (25 Mar 2025 11:09)      PHYSICAL EXAM:  General: NAD, alert and awake  HEENT: NCAT, EOMI, conjunctiva clear  Chest: nonlabored respirations, good inspiratory effort  Abdomen: soft, NTND.   Extremities: no pedal edema or calf tenderness noted   : uncircumcised phallus, adequate meatus with 16 fr cath in place draining cler yellow urine, 2600cc/24hours  left testicle noted swelling and mild TTP. no area of fluctuance    LABS:                        14.9   13.73 )-----------( 290      ( 26 Mar 2025 07:34 )             45.9   03-26    137  |  105  |  18  ----------------------------<  155[H]  3.9   |  24  |  1.15    Ca    9.9      26 Mar 2025 07:34      I&O's Detail    25 Mar 2025 07:01  -  26 Mar 2025 07:00  --------------------------------------------------------  IN:  Total IN: 0 mL    OUT:    Indwelling Catheter - Urethral (mL): 2600 mL  Total OUT: 2600 mL    Total NET: -2600 mL    Culture - Urine (03.24.25 @ 04:40)   Specimen Source: Clean Catch  Culture Results:   >100,000 CFU/ml Streptococcus agalactiae (Group B)   Group B streptococci are susceptible to ampicillin,   penicillin and cefazolin, but may be resistant to   erythromycin and clindamycin.    A/P: 74 yo M pmhx of DM, HTN, BPH admitted with left epididymoorchitis, BRENDAN.   pt found to be retaining with , 16 fr smith placed. pt has a 77g prostate. BRENDAN now resolved.   Ucx strep agalactiae. leukocytosis downtrending. afebrile   epididymoorchitis likely 2/2 UTI 2/2 UR  -c/w rocephin per ID  -c/w smith, monitor UOP  -f/u tumor markers and urine cytology since pt has had a hx of hematuria in the past  -f/u PSA  -c/w flomax and finasteride   -c/w scrotal elevation, analgesics prn  -continue care per primary team   -case discussed with Dr Bowers

## 2025-03-26 NOTE — CHART NOTE - NSCHARTNOTEFT_GEN_A_CORE
S: Called to see patient for complaint of chest discomfort      Patient is a  75y Male who presents with a chief complaint of Left epididymoorchitis with large complex left hydrocele/pyocele.   O: Patient is seen and evaluated Now c/o discomfort on his chest area and hip area radiating to his flank area. denies any associated symptoms like nausea, vomiting, SOB, Patient had 1 BM after the suppository.     T(C): 36.3 (03-26-25 @ 11:10), Max: 36.7 (03-26-25 @ 05:14)  HR: 78 (03-26-25 @ 11:10) (78 - 78)  BP: 99/64 (03-26-25 @ 11:10) (99/64 - 100/65)  RR: 17 (03-26-25 @ 11:10) (17 - 17)  SpO2: 95% (03-26-25 @ 11:10) (95% - 96%)     03-25 @ 07:01  -  03-26 @ 07:00  --------------------------------------------------------  IN: 0 mL / OUT: 2600 mL / NET: -2600 mL    03-26 @ 07:01  -  03-26 @ 14:20  --------------------------------------------------------  IN: 0 mL / OUT: 300 mL / NET: -300 mL       LABS  ==========                        14.9   13.73 )-----------( 290      ( 26 Mar 2025 07:34 )             45.9     03-26    137  |  105  |  18  ----------------------------<  155[H]  3.9   |  24  |  1.15    Ca    9.9      26 Mar 2025 07:34    Physical Exam:  General:    NAD, non toxic, RA  Head: atraumatic, normocephalic  Eyes: normal sclera and conjunctiva  ENT:   no oropharyngeal lesions, no LAD, neck supple  Cardio:  regular S1,S2, no audible murmur   Respiratory:   clear to auscultation b/l, no wheezing, no rhonchi   abd:   soft, BS +, not tender, but distended   :  no CVAT, no suprapubic tenderness, + smith, left testicle somewhat edematous and tender, no open wounds, no discharge   Musculoskeletal : no joint swelling, no edema  Skin:  no rash,     A/P : 75 year old male admitted with Left epididymoorchitis with large complex left hydrocele/pyocele. c/o chest discomfort  Assessment : r/o ACS ? Indigestion    Plan: EKG Stat done sinus rhythm with PAC'S 80bpm, RBBB  Cardiac enzymes ordered  Maalox 30 ml PO stat.  Monitor closely
Pt seen and examined with Dr. Angel at bedside resting comfortably   Pt reports he experiences difficulty urinating and incomplete voiding sensation at baseline, reports it has not changed since onset of testicular swelling  Pt reports continued testicular pain    On exam, L testicle enlarged with +TTP, no overlying skin changes, no area of fluctuance, no subcutaneous crepitus. R testicle WNL. No suprapubic tenderness or palpable bladder. , repeat 237    03-24    135  |  105  |  18  ----------------------------<  216[H]  4.1   |  26  |  1.31[H]    Ca    9.6      24 Mar 2025 09:30    TPro  8.0  /  Alb  3.1[L]  /  TBili  0.4  /  DBili  x   /  AST  18  /  ALT  21  /  AlkPhos  83  03-24                          14.3   13.55 )-----------( 256      ( 24 Mar 2025 09:30 )             44.2     A/P: 75M  h/o DM, BPH with left epididymoorchitis   Afebrile, leukocytosis improved, 13.55 from 14.45  Cr improving, 1.31 from 1.76  , repeat 237  As discussed with Dr. Angel:  - Continue antibiotics, follow up ID consult  - Recommend smith catheter, monitor urine output   - Continue to monitor Cr  - Continue care per primary team

## 2025-03-26 NOTE — PROGRESS NOTE ADULT - ASSESSMENT
A/P-  75 year old male with PMHx of HTN, NIDDM2, hypothyroidism, and BPH who presented to the ED on 3/24/25 for complaints of left testicular pain and swelling.     US reported as -  Left epididymoorchitis with large complex left hydrocele/pyocele    Infectious Disease consultation requested today to help with abx management     Afebrile  mild Leukocytosis of 13k  UA- negative for nitrate but pos for LE and wbc and bacteria    3/25: no fever, RA, leukocytosis without change 13.56, Cr normalized, UC is positive, pending ID,  GC Chlamydia negative, CT a/p - constipation, Bladder wall thickening. Pt's scrotal area with edema and tender to touch, abx continued.    3/26: afebrile, RA, no change in leukocytosis 13.73, Cr ok, UC grew Streptococcus group B, remains with Springer, no significant change in pt's scrotal swelling and discomfort since yesterday, Ceftriaxone IV continued (day #3). Pt complained of left sided chest pain without shortness of breath during my exam, appears comfortable in his chair, ordered stat EKG, discussed with RN and covering PA/NP.     Impression-  +leukocytosis  BPH  JEAN CLAUDE -mild  Left epididymoorchitis with large complex left hydrocele/pyocele- patient is >35 years of age and  and hence doubt GC/chlmaydia as the etiology as the etiology of the epididymorchitis, however, will need to rule it out still    Plan-  continue abx, ceftriaxone IV (day #3)  follow all culture data   urine GC /chlamydia - negative   Springer management per urology  Scrotal elevation  pain control  obtain EKG - ordered and discussed with RN and covering medicine PA/NP  chest pain workup  - cardiac enzymes were collected and pending result   rest of the medical management as per medicine  team    discussed with Dr. Oh  discussed with covering NP/PA  discussed with RN A/P-  75 year old male with PMHx of HTN, NIDDM2, hypothyroidism, and BPH who presented to the ED on 3/24/25 for complaints of left testicular pain and swelling.     US reported as -  Left epididymoorchitis with large complex left hydrocele/pyocele    Infectious Disease consultation requested today to help with abx management     Afebrile  mild Leukocytosis of 13k  UA- negative for nitrate but pos for LE and wbc and bacteria    3/25: no fever, RA, leukocytosis without change 13.56, Cr normalized, UC is positive, pending ID,  GC Chlamydia negative, CT a/p - constipation, Bladder wall thickening. Pt's scrotal area with edema and tender to touch, abx continued.    3/26: afebrile, RA, no change in leukocytosis 13.73, Cr ok, UC grew Streptococcus group B, remains with Springer, no significant change in pt's scrotal swelling and discomfort since yesterday, Ceftriaxone IV continued (day #3). Pt complained of left sided chest pain without shortness of breath during my exam, appears comfortable in his chair, ordered stat EKG, discussed with RN and covering PA/NP.     Impression-  +leukocytosis  BPH  JEAN CLAUDE -mild  Left epididymoorchitis with large complex left hydrocele/pyocele    Plan-  continue abx, ceftriaxone IV (day #3)  follow all culture data   urine GC /chlamydia - negative   Springer management per urology  Scrotal elevation  pain control  obtain EKG - ordered and discussed with RN and covering medicine PA/NP  chest pain workup  - cardiac enzymes were collected and pending result   rest of the medical management as per medicine  team    discussed with Dr. Oh  discussed with covering NP/PA  discussed with RN

## 2025-03-26 NOTE — PROGRESS NOTE ADULT - SUBJECTIVE AND OBJECTIVE BOX
LAUREN JEFFERY  MRN-[id number]  75y (1950)    Follow Up:  epididymo orchitis, uti    Interval History: NSLIJ VS video  used # [id number]. The pt was seen and examined earlier, not in acute distress, out of bed to chair, complains of scrotal swelling and mild pain, additionally, complains of left sided chest discomfort, able to carry a conversation, able to take a deep breath, no numbness or tingling in his fingers. Pt is afebrile, RA, leukocytosis is about the same 13.73.    PAST MEDICAL & SURGICAL HISTORY:  HTN (hypertension)      Diabetes mellitus type II, non insulin dependent      BPH (benign prostatic hyperplasia)      Hypothyroidism          ROS:    [ ] Unobtainable because:  [x ] All other systems negative    Constitutional: no fever, no chills  Head: no trauma  Eyes: no vision changes, no eye pain  ENT:  no sore throat, no rhinorrhea  Cardiovascular:  + chest pain, no palpitation  Respiratory:  no SOB, no cough  GI:  no abd pain, no vomiting, no diarrhea  urinary: with Smith, left scrotal swelling and pain   musculoskeletal:  no joint pain, no joint swelling  skin:  no rash  neurology:  no headache, no seizure, no change in mental status  psych: no anxiety, no depression         Allergies  No Known Allergies        ANTIMICROBIALS:  cefTRIAXone   IVPB 2000 every 24 hours      OTHER MEDS:  acetaminophen     Tablet .. 650 milliGRAM(s) Oral every 6 hours PRN  aluminum hydroxide/magnesium hydroxide/simethicone Suspension 30 milliLiter(s) Oral every 4 hours PRN  aspirin  chewable 81 milliGRAM(s) Oral daily  dextrose 5%. 1000 milliLiter(s) IV Continuous <Continuous>  dextrose 5%. 1000 milliLiter(s) IV Continuous <Continuous>  dextrose 50% Injectable 25 Gram(s) IV Push once  dextrose 50% Injectable 12.5 Gram(s) IV Push once  dextrose 50% Injectable 25 Gram(s) IV Push once  dextrose Oral Gel 15 Gram(s) Oral once PRN  famotidine    Tablet 20 milliGRAM(s) Oral daily  finasteride 5 milliGRAM(s) Oral daily  glucagon  Injectable 1 milliGRAM(s) IntraMuscular once  insulin lispro (ADMELOG) corrective regimen sliding scale   SubCutaneous three times a day before meals  levothyroxine 175 MICROGram(s) Oral daily  melatonin 3 milliGRAM(s) Oral at bedtime PRN  metoprolol succinate  milliGRAM(s) Oral at bedtime  ondansetron Injectable 4 milliGRAM(s) IV Push every 8 hours PRN  polyethylene glycol 3350 17 Gram(s) Oral two times a day  senna 2 Tablet(s) Oral two times a day  tamsulosin 0.4 milliGRAM(s) Oral at bedtime      Vital Signs Last 24 Hrs  T(C): 36.3 (26 Mar 2025 11:10), Max: 37.1 (25 Mar 2025 23:26)  T(F): 97.4 (26 Mar 2025 11:10), Max: 98.8 (25 Mar 2025 23:26)  HR: 78 (26 Mar 2025 11:10) (72 - 78)  BP: 99/64 (26 Mar 2025 11:10) (99/64 - 138/72)  BP(mean): --  RR: 17 (26 Mar 2025 11:10) (16 - 17)  SpO2: 95% (26 Mar 2025 11:10) (95% - 96%)    Parameters below as of 26 Mar 2025 11:10  Patient On (Oxygen Delivery Method): room air        Physical Exam:  General:    NAD, non toxic, RA  Head: atraumatic, normocephalic  Eyes: normal sclera and conjunctiva  ENT:   no oropharyngeal lesions, no LAD, neck supple  Cardio:    regular S1,S2, no audible murmur   Respiratory:   clear to auscultation b/l, no wheezing, no rhonchi   abd:   soft, BS +, not tender, no distention   :     no CVAT, no suprapubic tenderness, + smith, left testicle somewhat edematous and tender, no open wounds, no discharge   Musculoskeletal : no joint swelling, no edema  Skin:    no rash, PIV ok  vascular:  normal pulses  Neurologic:     no focal deficits  psych: normal affect    WBC Count: 13.73 K/uL (03-26 @ 07:34)  WBC Count: 13.56 K/uL (03-25 @ 06:30)  WBC Count: 13.55 K/uL (03-24 @ 09:30)  WBC Count: 14.45 K/uL (03-24 @ 02:00)                            14.9   13.73 )-----------( 290      ( 26 Mar 2025 07:34 )             45.9       03-26    137  |  105  |  18  ----------------------------<  155[H]  3.9   |  24  |  1.15    Ca    9.9      26 Mar 2025 07:34        Urinalysis Basic - ( 26 Mar 2025 07:34 )    Color: x / Appearance: x / SG: x / pH: x  Gluc: 155 mg/dL / Ketone: x  / Bili: x / Urobili: x   Blood: x / Protein: x / Nitrite: x   Leuk Esterase: x / RBC: x / WBC x   Sq Epi: x / Non Sq Epi: x / Bacteria: x        Creatinine Trend: 1.15<--, 1.15<--, 1.31<--, 1.76<--      MICROBIOLOGY:  v  Clean Catch  03-24-25   >100,000 CFU/ml Streptococcus agalactiae (Group B)  Group B streptococci are susceptible to ampicillin,  penicillin and cefazolin, but may be resistant to  erythromycin and clindamycin.  --  --      RADIOLOGY:     LAUREN JEFFERY  MRN-[id number]  75y (1950)    Follow Up:  epididymo orchitis, uti    Interval History: NSLIJ VS video  used # [id number]. The pt was seen and examined earlier, not in acute distress, out of bed to chair, complains of scrotal swelling and mild pain, additionally, complains of left sided chest discomfort, able to carry a conversation, able to take a deep breath, no numbness or tingling in his fingers. Pt is afebrile, RA, leukocytosis is about the same 13.73.    PAST MEDICAL & SURGICAL HISTORY:  HTN (hypertension)      Diabetes mellitus type II, non insulin dependent      BPH (benign prostatic hyperplasia)      Hypothyroidism          ROS:    [ ] Unobtainable because:  [x ] All other systems negative    Constitutional: no fever, no chills  Head: no trauma  Eyes: no vision changes, no eye pain  ENT:  no sore throat, no rhinorrhea  Cardiovascular:  + chest pain, no palpitation  Respiratory:  no SOB, no cough  GI:  no abd pain, no vomiting, no diarrhea  urinary: with Smith, left scrotal swelling and pain   musculoskeletal:  no joint pain, no joint swelling  skin:  no rash  neurology:  no headache, no seizure, no change in mental status  psych: no anxiety, no depression         Allergies  No Known Allergies        ANTIMICROBIALS:  cefTRIAXone   IVPB 2000 every 24 hours      OTHER MEDS:  acetaminophen     Tablet .. 650 milliGRAM(s) Oral every 6 hours PRN  aluminum hydroxide/magnesium hydroxide/simethicone Suspension 30 milliLiter(s) Oral every 4 hours PRN  aspirin  chewable 81 milliGRAM(s) Oral daily  dextrose 5%. 1000 milliLiter(s) IV Continuous <Continuous>  dextrose 5%. 1000 milliLiter(s) IV Continuous <Continuous>  dextrose 50% Injectable 25 Gram(s) IV Push once  dextrose 50% Injectable 12.5 Gram(s) IV Push once  dextrose 50% Injectable 25 Gram(s) IV Push once  dextrose Oral Gel 15 Gram(s) Oral once PRN  famotidine    Tablet 20 milliGRAM(s) Oral daily  finasteride 5 milliGRAM(s) Oral daily  glucagon  Injectable 1 milliGRAM(s) IntraMuscular once  insulin lispro (ADMELOG) corrective regimen sliding scale   SubCutaneous three times a day before meals  levothyroxine 175 MICROGram(s) Oral daily  melatonin 3 milliGRAM(s) Oral at bedtime PRN  metoprolol succinate  milliGRAM(s) Oral at bedtime  ondansetron Injectable 4 milliGRAM(s) IV Push every 8 hours PRN  polyethylene glycol 3350 17 Gram(s) Oral two times a day  senna 2 Tablet(s) Oral two times a day  tamsulosin 0.4 milliGRAM(s) Oral at bedtime      Vital Signs Last 24 Hrs  T(C): 36.3 (26 Mar 2025 11:10), Max: 37.1 (25 Mar 2025 23:26)  T(F): 97.4 (26 Mar 2025 11:10), Max: 98.8 (25 Mar 2025 23:26)  HR: 78 (26 Mar 2025 11:10) (72 - 78)  BP: 99/64 (26 Mar 2025 11:10) (99/64 - 138/72)  BP(mean): --  RR: 17 (26 Mar 2025 11:10) (16 - 17)  SpO2: 95% (26 Mar 2025 11:10) (95% - 96%)    Parameters below as of 26 Mar 2025 11:10  Patient On (Oxygen Delivery Method): room air        Physical Exam:  General:    NAD, non toxic, RA  Head: atraumatic, normocephalic  Eyes: normal sclera and conjunctiva  ENT:   no oropharyngeal lesions, no LAD, neck supple  Cardio:    regular S1,S2, no audible murmur   Respiratory:   clear to auscultation b/l, no wheezing, no rhonchi   abd:   soft, BS +, not tender, no distention   :     no CVAT, no suprapubic tenderness, + smith, left testicle somewhat edematous and tender, no open wounds, no discharge   Musculoskeletal : no joint swelling, no edema  Skin:    no rash, PIV ok  vascular:  normal pulses  Neurologic:     no focal deficits  psych: normal affect    WBC Count: 13.73 K/uL (03-26 @ 07:34)  WBC Count: 13.56 K/uL (03-25 @ 06:30)  WBC Count: 13.55 K/uL (03-24 @ 09:30)  WBC Count: 14.45 K/uL (03-24 @ 02:00)                            14.9   13.73 )-----------( 290      ( 26 Mar 2025 07:34 )             45.9       03-26    137  |  105  |  18  ----------------------------<  155[H]  3.9   |  24  |  1.15    Ca    9.9      26 Mar 2025 07:34        Urinalysis Basic - ( 26 Mar 2025 07:34 )    Color: x / Appearance: x / SG: x / pH: x  Gluc: 155 mg/dL / Ketone: x  / Bili: x / Urobili: x   Blood: x / Protein: x / Nitrite: x   Leuk Esterase: x / RBC: x / WBC x   Sq Epi: x / Non Sq Epi: x / Bacteria: x        Creatinine Trend: 1.15<--, 1.15<--, 1.31<--, 1.76<--      MICROBIOLOGY:    Clean Catch  03-24-25   >100,000 CFU/ml Streptococcus agalactiae (Group B)  Group B streptococci are susceptible to ampicillin,  penicillin and cefazolin, but may be resistant to  erythromycin and clindamycin.  --  --      RADIOLOGY:

## 2025-03-26 NOTE — PROGRESS NOTE ADULT - SUBJECTIVE AND OBJECTIVE BOX
Patient is a 75y old  Male who presents with a chief complaint of Left epididymoorchitis with large complex left hydrocele/pyocele (26 Mar 2025 14:17)      INTERVAL HPI/OVERNIGHT EVENTS: No acute events overnight.     MEDICATIONS  (STANDING):  aspirin  chewable 81 milliGRAM(s) Oral daily  cefTRIAXone   IVPB 2000 milliGRAM(s) IV Intermittent every 24 hours  dextrose 5%. 1000 milliLiter(s) (100 mL/Hr) IV Continuous <Continuous>  dextrose 5%. 1000 milliLiter(s) (50 mL/Hr) IV Continuous <Continuous>  dextrose 50% Injectable 25 Gram(s) IV Push once  dextrose 50% Injectable 12.5 Gram(s) IV Push once  dextrose 50% Injectable 25 Gram(s) IV Push once  famotidine    Tablet 20 milliGRAM(s) Oral daily  finasteride 5 milliGRAM(s) Oral daily  glucagon  Injectable 1 milliGRAM(s) IntraMuscular once  insulin lispro (ADMELOG) corrective regimen sliding scale   SubCutaneous three times a day before meals  levothyroxine 175 MICROGram(s) Oral daily  metoprolol succinate  milliGRAM(s) Oral at bedtime  polyethylene glycol 3350 17 Gram(s) Oral two times a day  senna 2 Tablet(s) Oral two times a day  tamsulosin 0.4 milliGRAM(s) Oral at bedtime    MEDICATIONS  (PRN):  acetaminophen     Tablet .. 650 milliGRAM(s) Oral every 6 hours PRN Temp greater or equal to 38C (100.4F), Mild Pain (1 - 3)  aluminum hydroxide/magnesium hydroxide/simethicone Suspension 30 milliLiter(s) Oral every 4 hours PRN Dyspepsia  dextrose Oral Gel 15 Gram(s) Oral once PRN Blood Glucose LESS THAN 70 milliGRAM(s)/deciliter  melatonin 3 milliGRAM(s) Oral at bedtime PRN Insomnia  ondansetron Injectable 4 milliGRAM(s) IV Push every 8 hours PRN Nausea and/or Vomiting      Allergies    No Known Allergies    Intolerances        REVIEW OF SYSTEMS:  CONSTITUTIONAL: +ve for fatigue  EYES: No eye pain, visual disturbances, or discharge  ENMT:  No difficulty hearing, tinnitus, vertigo; No sinus or throat pain      Vital Signs Last 24 Hrs  T(C): 36.3 (26 Mar 2025 11:10), Max: 37.1 (25 Mar 2025 23:26)  T(F): 97.4 (26 Mar 2025 11:10), Max: 98.8 (25 Mar 2025 23:26)  HR: 78 (26 Mar 2025 11:10) (72 - 78)  BP: 99/64 (26 Mar 2025 11:10) (99/64 - 138/72)  BP(mean): --  RR: 17 (26 Mar 2025 11:10) (16 - 17)  SpO2: 95% (26 Mar 2025 11:10) (95% - 96%)    Parameters below as of 26 Mar 2025 11:10  Patient On (Oxygen Delivery Method): room air        PHYSICAL EXAM:    HEAD:  Atraumatic, Normocephalic  EYES: EOMI, PERRLA, conjunctiva and sclera clear  ENMT: No tonsillar erythema, exudates, or enlargement; Moist mucous membranes, Good dentition, No lesions      LABS:                        14.9   13.73 )-----------( 290      ( 26 Mar 2025 07:34 )             45.9     03-26    137  |  105  |  18  ----------------------------<  155[H]  3.9   |  24  |  1.15    Ca    9.9      26 Mar 2025 07:34        Urinalysis Basic - ( 26 Mar 2025 07:34 )    Color: x / Appearance: x / SG: x / pH: x  Gluc: 155 mg/dL / Ketone: x  / Bili: x / Urobili: x   Blood: x / Protein: x / Nitrite: x   Leuk Esterase: x / RBC: x / WBC x   Sq Epi: x / Non Sq Epi: x / Bacteria: x      CAPILLARY BLOOD GLUCOSE      POCT Blood Glucose.: 222 mg/dL (26 Mar 2025 11:24)  POCT Blood Glucose.: 153 mg/dL (26 Mar 2025 07:54)  POCT Blood Glucose.: 219 mg/dL (25 Mar 2025 21:15)  POCT Blood Glucose.: 132 mg/dL (25 Mar 2025 16:04)

## 2025-03-27 LAB
ANION GAP SERPL CALC-SCNC: 8 MMOL/L — SIGNIFICANT CHANGE UP (ref 5–17)
BUN SERPL-MCNC: 24 MG/DL — HIGH (ref 7–23)
CALCIUM SERPL-MCNC: 9.9 MG/DL — SIGNIFICANT CHANGE UP (ref 8.5–10.1)
CHLORIDE SERPL-SCNC: 106 MMOL/L — SIGNIFICANT CHANGE UP (ref 96–108)
CO2 SERPL-SCNC: 23 MMOL/L — SIGNIFICANT CHANGE UP (ref 22–31)
CREAT SERPL-MCNC: 1.14 MG/DL — SIGNIFICANT CHANGE UP (ref 0.5–1.3)
EGFR: 67 ML/MIN/1.73M2 — SIGNIFICANT CHANGE UP
EGFR: 67 ML/MIN/1.73M2 — SIGNIFICANT CHANGE UP
GLUCOSE BLDC GLUCOMTR-MCNC: 175 MG/DL — HIGH (ref 70–99)
GLUCOSE BLDC GLUCOMTR-MCNC: 193 MG/DL — HIGH (ref 70–99)
GLUCOSE BLDC GLUCOMTR-MCNC: 250 MG/DL — HIGH (ref 70–99)
GLUCOSE SERPL-MCNC: 208 MG/DL — HIGH (ref 70–99)
HCT VFR BLD CALC: 45.1 % — SIGNIFICANT CHANGE UP (ref 39–50)
HGB BLD-MCNC: 14.6 G/DL — SIGNIFICANT CHANGE UP (ref 13–17)
MCHC RBC-ENTMCNC: 28 PG — SIGNIFICANT CHANGE UP (ref 27–34)
MCHC RBC-ENTMCNC: 32.4 G/DL — SIGNIFICANT CHANGE UP (ref 32–36)
MCV RBC AUTO: 86.4 FL — SIGNIFICANT CHANGE UP (ref 80–100)
NRBC BLD AUTO-RTO: 0 /100 WBCS — SIGNIFICANT CHANGE UP (ref 0–0)
PLATELET # BLD AUTO: 285 K/UL — SIGNIFICANT CHANGE UP (ref 150–400)
POTASSIUM SERPL-MCNC: 4.3 MMOL/L — SIGNIFICANT CHANGE UP (ref 3.5–5.3)
POTASSIUM SERPL-SCNC: 4.3 MMOL/L — SIGNIFICANT CHANGE UP (ref 3.5–5.3)
PSA FLD-MCNC: 2.65 NG/ML — SIGNIFICANT CHANGE UP (ref 0–4)
RBC # BLD: 5.22 M/UL — SIGNIFICANT CHANGE UP (ref 4.2–5.8)
RBC # FLD: 14.2 % — SIGNIFICANT CHANGE UP (ref 10.3–14.5)
SODIUM SERPL-SCNC: 137 MMOL/L — SIGNIFICANT CHANGE UP (ref 135–145)
WBC # BLD: 11.89 K/UL — HIGH (ref 3.8–10.5)
WBC # FLD AUTO: 11.89 K/UL — HIGH (ref 3.8–10.5)

## 2025-03-27 PROCEDURE — 99233 SBSQ HOSP IP/OBS HIGH 50: CPT

## 2025-03-27 PROCEDURE — 99232 SBSQ HOSP IP/OBS MODERATE 35: CPT

## 2025-03-27 PROCEDURE — G0545: CPT

## 2025-03-27 RX ORDER — CEFTRIAXONE 500 MG/1
2000 INJECTION, POWDER, FOR SOLUTION INTRAMUSCULAR; INTRAVENOUS EVERY 24 HOURS
Refills: 0 | Status: DISCONTINUED | OUTPATIENT
Start: 2025-03-28 | End: 2025-03-28

## 2025-03-27 RX ORDER — CEFTRIAXONE 500 MG/1
2 INJECTION, POWDER, FOR SOLUTION INTRAMUSCULAR; INTRAVENOUS
Qty: 8 | Refills: 0
Start: 2025-03-27 | End: 2025-03-30

## 2025-03-27 RX ADMIN — INSULIN LISPRO 2: 100 INJECTION, SOLUTION INTRAVENOUS; SUBCUTANEOUS at 11:40

## 2025-03-27 RX ADMIN — Medication 650 MILLIGRAM(S): at 07:00

## 2025-03-27 RX ADMIN — INSULIN LISPRO 1: 100 INJECTION, SOLUTION INTRAVENOUS; SUBCUTANEOUS at 08:11

## 2025-03-27 RX ADMIN — LIDOCAINE HYDROCHLORIDE 1 PATCH: 20 JELLY TOPICAL at 11:45

## 2025-03-27 RX ADMIN — TAMSULOSIN HYDROCHLORIDE 0.4 MILLIGRAM(S): 0.4 CAPSULE ORAL at 22:07

## 2025-03-27 RX ADMIN — Medication 175 MICROGRAM(S): at 05:59

## 2025-03-27 RX ADMIN — Medication 81 MILLIGRAM(S): at 11:40

## 2025-03-27 RX ADMIN — LIDOCAINE HYDROCHLORIDE 1 PATCH: 20 JELLY TOPICAL at 19:01

## 2025-03-27 RX ADMIN — LIDOCAINE HYDROCHLORIDE 1 PATCH: 20 JELLY TOPICAL at 05:49

## 2025-03-27 RX ADMIN — METOPROLOL SUCCINATE 100 MILLIGRAM(S): 50 TABLET, EXTENDED RELEASE ORAL at 22:08

## 2025-03-27 RX ADMIN — FINASTERIDE 5 MILLIGRAM(S): 1 TABLET, FILM COATED ORAL at 11:41

## 2025-03-27 RX ADMIN — POLYETHYLENE GLYCOL 3350 17 GRAM(S): 17 POWDER, FOR SOLUTION ORAL at 18:18

## 2025-03-27 RX ADMIN — CEFTRIAXONE 100 MILLIGRAM(S): 500 INJECTION, POWDER, FOR SOLUTION INTRAMUSCULAR; INTRAVENOUS at 05:56

## 2025-03-27 RX ADMIN — INSULIN LISPRO 1: 100 INJECTION, SOLUTION INTRAVENOUS; SUBCUTANEOUS at 16:38

## 2025-03-27 RX ADMIN — Medication 2 TABLET(S): at 18:18

## 2025-03-27 RX ADMIN — Medication 20 MILLIGRAM(S): at 11:41

## 2025-03-27 RX ADMIN — Medication 650 MILLIGRAM(S): at 05:56

## 2025-03-27 RX ADMIN — LIDOCAINE HYDROCHLORIDE 1 PATCH: 20 JELLY TOPICAL at 23:45

## 2025-03-27 RX ADMIN — POLYETHYLENE GLYCOL 3350 17 GRAM(S): 17 POWDER, FOR SOLUTION ORAL at 05:59

## 2025-03-27 RX ADMIN — Medication 2 TABLET(S): at 05:56

## 2025-03-27 NOTE — PROGRESS NOTE ADULT - ASSESSMENT
A/P-  75 year old male with PMHx of HTN, NIDDM2, hypothyroidism, and BPH who presented to the ED on 3/24/25 for complaints of left testicular pain and swelling.     US reported as -  Left epididymoorchitis with large complex left hydrocele/pyocele    Infectious Disease consultation requested today to help with abx management     Afebrile  mild Leukocytosis of 13k  UA- negative for nitrate but pos for LE and wbc and bacteria    3/25: no fever, RA, leukocytosis without change 13.56, Cr normalized, UC is positive, pending ID,  GC Chlamydia negative, CT a/p - constipation, Bladder wall thickening. Pt's scrotal area with edema and tender to touch, abx continued.    3/26: afebrile, RA, no change in leukocytosis 13.73, Cr ok, UC grew Streptococcus group B, remains with Springer, no significant change in pt's scrotal swelling and discomfort since yesterday, Ceftriaxone IV continued (day #3). Pt complained of left sided chest pain without shortness of breath during my exam, appears comfortable in his chair, ordered stat EKG, discussed with RN and covering PA/NP.   3/27: improving, no fevers, RA, leukocytosis is better 11.89, pt will need 4 more days of IV abx, Ceftriaxone IV, Rx provided. The plan was discussed with the pt, all questions were answered to the best of my ability.     Impression-  +leukocytosis  BPH  JEAN CLAUDE -mild  Left epididymoorchitis with large complex left hydrocele/pyocele    Plan-  continue abx, ceftriaxone 2 grams IV daily (day #3), needs 4 more days   follow all culture data   urine GC /chlamydia - negative   Springer management per urology  Scrotal elevation  pain control  rest of the medical management as per medicine  team    when ready for discharge:  place a midline  continue ceftriaxone 2 grams IV q 24 hrs - last dose 4/1/2025  no need for lab work  follow up with ID:  [address] [place] [id number] [place] [address] [phone]  remove midline upon completion of the course of abx  Rx provided     discussed with Dr. Oh  discussed with Dr. Kurtz  discussed with the pt, all questions were answered to the best of my ability

## 2025-03-27 NOTE — PROGRESS NOTE ADULT - SUBJECTIVE AND OBJECTIVE BOX
Urology DAILY PROGRESS NOTE:       SUBJECTIVE/ROS: pt seen and examined at bedside. c/o left scrotum swelling and pain. denies f/c/n/v.          MEDICATIONS  (STANDING):  aspirin  chewable 81 milliGRAM(s) Oral daily  dextrose 5%. 1000 milliLiter(s) (100 mL/Hr) IV Continuous <Continuous>  dextrose 5%. 1000 milliLiter(s) (50 mL/Hr) IV Continuous <Continuous>  dextrose 50% Injectable 25 Gram(s) IV Push once  dextrose 50% Injectable 12.5 Gram(s) IV Push once  dextrose 50% Injectable 25 Gram(s) IV Push once  famotidine    Tablet 20 milliGRAM(s) Oral daily  finasteride 5 milliGRAM(s) Oral daily  glucagon  Injectable 1 milliGRAM(s) IntraMuscular once  insulin lispro (ADMELOG) corrective regimen sliding scale   SubCutaneous three times a day before meals  levothyroxine 175 MICROGram(s) Oral daily  lidocaine   4% Patch 1 Patch Transdermal daily  metoprolol succinate  milliGRAM(s) Oral at bedtime  polyethylene glycol 3350 17 Gram(s) Oral two times a day  senna 2 Tablet(s) Oral two times a day  tamsulosin 0.4 milliGRAM(s) Oral at bedtime    MEDICATIONS  (PRN):  acetaminophen     Tablet .. 650 milliGRAM(s) Oral every 6 hours PRN Temp greater or equal to 38C (100.4F), Mild Pain (1 - 3)  aluminum hydroxide/magnesium hydroxide/simethicone Suspension 30 milliLiter(s) Oral every 4 hours PRN Dyspepsia  dextrose Oral Gel 15 Gram(s) Oral once PRN Blood Glucose LESS THAN 70 milliGRAM(s)/deciliter  melatonin 3 milliGRAM(s) Oral at bedtime PRN Insomnia  ondansetron Injectable 4 milliGRAM(s) IV Push every 8 hours PRN Nausea and/or Vomiting      OBJECTIVE:    Vital Signs Last 24 Hrs  T(C): 36.5 (27 Mar 2025 10:33), Max: 36.7 (27 Mar 2025 05:30)  T(F): 97.7 (27 Mar 2025 10:33), Max: 98.1 (27 Mar 2025 05:30)  HR: 82 (27 Mar 2025 10:33) (58 - 82)  BP: 110/63 (27 Mar 2025 10:33) (110/63 - 145/74)  BP(mean): --  RR: 18 (27 Mar 2025 10:33) (16 - 18)  SpO2: 94% (27 Mar 2025 10:33) (94% - 99%)    Parameters below as of 27 Mar 2025 10:33  Patient On (Oxygen Delivery Method): room air            I&O's Detail    26 Mar 2025 07:01  -  27 Mar 2025 07:00  --------------------------------------------------------  IN:  Total IN: 0 mL    OUT:    Indwelling Catheter - Urethral (mL): 1200 mL  Total OUT: 1200 mL    Total NET: -1200 mL          Daily     Daily     LABS:                        14.6   11.89 )-----------( 285      ( 27 Mar 2025 07:45 )             45.1     03-27    137  |  106  |  24[H]  ----------------------------<  208[H]  4.3   |  23  |  1.14    Ca    9.9      27 Mar 2025 07:45        Urinalysis Basic - ( 27 Mar 2025 07:45 )    Color: x / Appearance: x / SG: x / pH: x  Gluc: 208 mg/dL / Ketone: x  / Bili: x / Urobili: x   Blood: x / Protein: x / Nitrite: x   Leuk Esterase: x / RBC: x / WBC x   Sq Epi: x / Non Sq Epi: x / Bacteria: x                PHYSICAL EXAM:  Constitutional: well developed, well nourished, NAD  Eyes: anicteric  ENMT: normal facies, symmetric  Neck: supple  Respiratory: CTA bilaterally  Cardiovascular: RRR  Gastrointestinal: abdomen soft, nontender, nondistended. No obvious masses. No peritonitis  Extremities: FROM, warm  Neurological: intact, non-focal  Skin: no gross lesions  Lymph Nodes: no gross adenopathy  Musculoskeletal: equal strength bilateral upper and lower extremities  Psychiatric: oriented x 3; appropriate

## 2025-03-27 NOTE — PROGRESS NOTE ADULT - ASSESSMENT
A/P: 76 yo M pmhx of DM, HTN, BPH admitted with left epididymoorchitis, BRENDAN.   pt found to be retaining with , 16 fr smith placed. pt has a 77g prostate. BRENDAN now resolved.   Ucx strep agalactiae. leukocytosis downtrending. afebrile   epididymoorchitis likely 2/2 UTI 2/2 UR    -continue Abx per ID's recxs  -c/w smith, monitor UOP  -f/u tumor markers and urine cytology since pt has had a hx of hematuria in the past  -f/u PSA  -c/w flomax and finasteride   -c/w scrotal elevation, analgesics prn  -discharge home with smith cath and follow up with Urology outpatient within 1 week when pt's cleared for discharge by primary team  -continue care per primary team       will d/w Dr. Angel   Urology   Teri Gilliam PA-C

## 2025-03-27 NOTE — PROGRESS NOTE ADULT - SUBJECTIVE AND OBJECTIVE BOX
JOSE D, MA  MRN-[id number]  75y (1950)    Follow Up:  epididymo orchitis, uti     Interval History: The pt was seen and examined earlier, not in acute distress, out of bed to chair. [facility] [employer] Video  used # [id number]. Pt reports that his scrotum still swollen and tender, however, pain level continues to improve. Pt is afebrile, RA, leukocytosis is improving, 11.89.    PAST MEDICAL & SURGICAL HISTORY:  HTN (hypertension)      Diabetes mellitus type II, non insulin dependent      BPH (benign prostatic hyperplasia)      Hypothyroidism          ROS:    [ ] Unobtainable because:  [x ] All other systems negative    Constitutional: no fever, no chills  Head: no trauma  Eyes: no vision changes, no eye pain  ENT:  no sore throat, no rhinorrhea  Cardiovascular:  no chest pain, no palpitation  Respiratory:  no SOB, no cough  GI:  no abd pain, no vomiting, no diarrhea  urinary: no dysuria, no hematuria, no flank pain, + scrotal pain is better, still with swelling   musculoskeletal:  no joint pain, no joint swelling  skin:  no rash  neurology:  no headache, no seizure, no change in mental status  psych: no anxiety, no depression         Allergies  No Known Allergies        ANTIMICROBIALS:      OTHER MEDS:  acetaminophen     Tablet .. 650 milliGRAM(s) Oral every 6 hours PRN  aluminum hydroxide/magnesium hydroxide/simethicone Suspension 30 milliLiter(s) Oral every 4 hours PRN  aspirin  chewable 81 milliGRAM(s) Oral daily  dextrose 5%. 1000 milliLiter(s) IV Continuous <Continuous>  dextrose 5%. 1000 milliLiter(s) IV Continuous <Continuous>  dextrose 50% Injectable 25 Gram(s) IV Push once  dextrose 50% Injectable 12.5 Gram(s) IV Push once  dextrose 50% Injectable 25 Gram(s) IV Push once  dextrose Oral Gel 15 Gram(s) Oral once PRN  famotidine    Tablet 20 milliGRAM(s) Oral daily  finasteride 5 milliGRAM(s) Oral daily  glucagon  Injectable 1 milliGRAM(s) IntraMuscular once  insulin lispro (ADMELOG) corrective regimen sliding scale   SubCutaneous three times a day before meals  levothyroxine 175 MICROGram(s) Oral daily  lidocaine   4% Patch 1 Patch Transdermal daily  melatonin 3 milliGRAM(s) Oral at bedtime PRN  metoprolol succinate  milliGRAM(s) Oral at bedtime  ondansetron Injectable 4 milliGRAM(s) IV Push every 8 hours PRN  polyethylene glycol 3350 17 Gram(s) Oral two times a day  senna 2 Tablet(s) Oral two times a day  tamsulosin 0.4 milliGRAM(s) Oral at bedtime      Vital Signs Last 24 Hrs  T(C): 36.5 (27 Mar 2025 10:33), Max: 36.7 (27 Mar 2025 05:30)  T(F): 97.7 (27 Mar 2025 10:33), Max: 98.1 (27 Mar 2025 05:30)  HR: 82 (27 Mar 2025 10:33) (58 - 82)  BP: 110/63 (27 Mar 2025 10:33) (110/63 - 145/74)  BP(mean): --  RR: 18 (27 Mar 2025 10:33) (16 - 18)  SpO2: 94% (27 Mar 2025 10:33) (94% - 99%)    Parameters below as of 27 Mar 2025 10:33  Patient On (Oxygen Delivery Method): room air        Physical Exam:  General:    NAD, non toxic, RA  Head: atraumatic, normocephalic  Eyes: normal sclera and conjunctiva  ENT:   no oropharyngeal lesions, no LAD, neck supple  Cardio:    regular S1,S2, no audible murmur   Respiratory:   clear to auscultation b/l, no wheezing, no rhonchi   abd:   soft, BS +, not tender, no distention   :     no CVAT, no suprapubic tenderness, + smith, left testicle swelling with some improvement, less tender, no open wounds, no discharge   Musculoskeletal : no joint swelling, no edema  Skin:    no rash, PIV ok  vascular:  normal pulses  Neurologic:     no focal deficits  psych: normal affect    WBC Count: 11.89 K/uL (03-27 @ 07:45)  WBC Count: 13.73 K/uL (03-26 @ 07:34)  WBC Count: 13.56 K/uL (03-25 @ 06:30)  WBC Count: 13.55 K/uL (03-24 @ 09:30)  WBC Count: 14.45 K/uL (03-24 @ 02:00)                            14.6   11.89 )-----------( 285      ( 27 Mar 2025 07:45 )             45.1       03-27    137  |  106  |  24[H]  ----------------------------<  208[H]  4.3   |  23  |  1.14    Ca    9.9      27 Mar 2025 07:45        Urinalysis Basic - ( 27 Mar 2025 07:45 )    Color: x / Appearance: x / SG: x / pH: x  Gluc: 208 mg/dL / Ketone: x  / Bili: x / Urobili: x   Blood: x / Protein: x / Nitrite: x   Leuk Esterase: x / RBC: x / WBC x   Sq Epi: x / Non Sq Epi: x / Bacteria: x        Creatinine Trend: 1.14<--, 1.15<--, 1.15<--, 1.31<--, 1.76<--      MICROBIOLOGY:  v  Clean Catch  03-24-25   >100,000 CFU/ml Streptococcus agalactiae (Group B)  Group B streptococci are susceptible to ampicillin,  penicillin and cefazolin, but may be resistant to  erythromycin and clindamycin.  --  --            RADIOLOGY:     JOSE D, MA  MRN-[id number]  75y (1950)    Follow Up:  epididymo orchitis, uti     Interval History: The pt was seen and examined earlier, not in acute distress, out of bed to chair. [facility] [employer] Video  used # [id number]. Pt reports that his scrotum still swollen and tender, however, pain level continues to improve. Pt is afebrile, RA, leukocytosis is improving, 11.89.    PAST MEDICAL & SURGICAL HISTORY:  HTN (hypertension)      Diabetes mellitus type II, non insulin dependent      BPH (benign prostatic hyperplasia)      Hypothyroidism          ROS:    [ ] Unobtainable because:  [x ] All other systems negative    Constitutional: no fever, no chills  Head: no trauma  Eyes: no vision changes, no eye pain  ENT:  no sore throat, no rhinorrhea  Cardiovascular:  no chest pain, no palpitation  Respiratory:  no SOB, no cough  GI:  no abd pain, no vomiting, no diarrhea  urinary: no dysuria, no hematuria, no flank pain, + scrotal pain is better, still with swelling   musculoskeletal:  no joint pain, no joint swelling  skin:  no rash  neurology:  no headache, no seizure, no change in mental status  psych: no anxiety, no depression         Allergies  No Known Allergies        ANTIMICROBIALS:      OTHER MEDS:  acetaminophen     Tablet .. 650 milliGRAM(s) Oral every 6 hours PRN  aluminum hydroxide/magnesium hydroxide/simethicone Suspension 30 milliLiter(s) Oral every 4 hours PRN  aspirin  chewable 81 milliGRAM(s) Oral daily  dextrose 5%. 1000 milliLiter(s) IV Continuous <Continuous>  dextrose 5%. 1000 milliLiter(s) IV Continuous <Continuous>  dextrose 50% Injectable 25 Gram(s) IV Push once  dextrose 50% Injectable 12.5 Gram(s) IV Push once  dextrose 50% Injectable 25 Gram(s) IV Push once  dextrose Oral Gel 15 Gram(s) Oral once PRN  famotidine    Tablet 20 milliGRAM(s) Oral daily  finasteride 5 milliGRAM(s) Oral daily  glucagon  Injectable 1 milliGRAM(s) IntraMuscular once  insulin lispro (ADMELOG) corrective regimen sliding scale   SubCutaneous three times a day before meals  levothyroxine 175 MICROGram(s) Oral daily  lidocaine   4% Patch 1 Patch Transdermal daily  melatonin 3 milliGRAM(s) Oral at bedtime PRN  metoprolol succinate  milliGRAM(s) Oral at bedtime  ondansetron Injectable 4 milliGRAM(s) IV Push every 8 hours PRN  polyethylene glycol 3350 17 Gram(s) Oral two times a day  senna 2 Tablet(s) Oral two times a day  tamsulosin 0.4 milliGRAM(s) Oral at bedtime      Vital Signs Last 24 Hrs  T(C): 36.5 (27 Mar 2025 10:33), Max: 36.7 (27 Mar 2025 05:30)  T(F): 97.7 (27 Mar 2025 10:33), Max: 98.1 (27 Mar 2025 05:30)  HR: 82 (27 Mar 2025 10:33) (58 - 82)  BP: 110/63 (27 Mar 2025 10:33) (110/63 - 145/74)  BP(mean): --  RR: 18 (27 Mar 2025 10:33) (16 - 18)  SpO2: 94% (27 Mar 2025 10:33) (94% - 99%)    Parameters below as of 27 Mar 2025 10:33  Patient On (Oxygen Delivery Method): room air        Physical Exam:  General:    NAD, non toxic, RA  Head: atraumatic, normocephalic  Eyes: normal sclera and conjunctiva  ENT:   no oropharyngeal lesions, no LAD, neck supple  Cardio:    regular S1,S2, no audible murmur   Respiratory:   clear to auscultation b/l, no wheezing, no rhonchi   abd:   soft, BS +, not tender, no distention   :     no CVAT, no suprapubic tenderness, + smith, left testicle swelling with some improvement, less tender, no open wounds, no discharge   Musculoskeletal : no joint swelling, no edema  Skin:    no rash, PIV ok  vascular:  normal pulses  Neurologic:     no focal deficits  psych: normal affect    WBC Count: 11.89 K/uL (03-27 @ 07:45)  WBC Count: 13.73 K/uL (03-26 @ 07:34)  WBC Count: 13.56 K/uL (03-25 @ 06:30)  WBC Count: 13.55 K/uL (03-24 @ 09:30)  WBC Count: 14.45 K/uL (03-24 @ 02:00)                            14.6   11.89 )-----------( 285      ( 27 Mar 2025 07:45 )             45.1       03-27    137  |  106  |  24[H]  ----------------------------<  208[H]  4.3   |  23  |  1.14    Ca    9.9      27 Mar 2025 07:45        Urinalysis Basic - ( 27 Mar 2025 07:45 )    Color: x / Appearance: x / SG: x / pH: x  Gluc: 208 mg/dL / Ketone: x  / Bili: x / Urobili: x   Blood: x / Protein: x / Nitrite: x   Leuk Esterase: x / RBC: x / WBC x   Sq Epi: x / Non Sq Epi: x / Bacteria: x        Creatinine Trend: 1.14<--, 1.15<--, 1.15<--, 1.31<--, 1.76<--      MICROBIOLOGY:    Clean Catch  03-24-25   >100,000 CFU/ml Streptococcus agalactiae (Group B)  Group B streptococci are susceptible to ampicillin,  penicillin and cefazolin, but may be resistant to  erythromycin and clindamycin.  --  --            RADIOLOGY:

## 2025-03-27 NOTE — PROGRESS NOTE ADULT - SUBJECTIVE AND OBJECTIVE BOX
Patient is a 75y old  Male who presents with a chief complaint of Left epididymoorchitis with large complex left hydrocele/pyocele (26 Mar 2025 15:33)      INTERVAL HPI/OVERNIGHT EVENTS: No acute events overnight.     MEDICATIONS  (STANDING):  aspirin  chewable 81 milliGRAM(s) Oral daily  dextrose 5%. 1000 milliLiter(s) (100 mL/Hr) IV Continuous <Continuous>  dextrose 5%. 1000 milliLiter(s) (50 mL/Hr) IV Continuous <Continuous>  dextrose 50% Injectable 25 Gram(s) IV Push once  dextrose 50% Injectable 12.5 Gram(s) IV Push once  dextrose 50% Injectable 25 Gram(s) IV Push once  famotidine    Tablet 20 milliGRAM(s) Oral daily  finasteride 5 milliGRAM(s) Oral daily  glucagon  Injectable 1 milliGRAM(s) IntraMuscular once  insulin lispro (ADMELOG) corrective regimen sliding scale   SubCutaneous three times a day before meals  levothyroxine 175 MICROGram(s) Oral daily  lidocaine   4% Patch 1 Patch Transdermal daily  metoprolol succinate  milliGRAM(s) Oral at bedtime  polyethylene glycol 3350 17 Gram(s) Oral two times a day  senna 2 Tablet(s) Oral two times a day  tamsulosin 0.4 milliGRAM(s) Oral at bedtime    MEDICATIONS  (PRN):  acetaminophen     Tablet .. 650 milliGRAM(s) Oral every 6 hours PRN Temp greater or equal to 38C (100.4F), Mild Pain (1 - 3)  aluminum hydroxide/magnesium hydroxide/simethicone Suspension 30 milliLiter(s) Oral every 4 hours PRN Dyspepsia  dextrose Oral Gel 15 Gram(s) Oral once PRN Blood Glucose LESS THAN 70 milliGRAM(s)/deciliter  melatonin 3 milliGRAM(s) Oral at bedtime PRN Insomnia  ondansetron Injectable 4 milliGRAM(s) IV Push every 8 hours PRN Nausea and/or Vomiting      Allergies    No Known Allergies    Intolerances        REVIEW OF SYSTEMS:  CONSTITUTIONAL: +ve for fatigue  EYES: No eye pain, visual disturbances, or discharge  ENMT:  No difficulty hearing, tinnitus, vertigo; No sinus or throat pain  NECK: No pain or stiffness      Vital Signs Last 24 Hrs  T(C): 36.5 (27 Mar 2025 10:33), Max: 36.7 (27 Mar 2025 05:30)  T(F): 97.7 (27 Mar 2025 10:33), Max: 98.1 (27 Mar 2025 05:30)  HR: 82 (27 Mar 2025 10:33) (58 - 82)  BP: 110/63 (27 Mar 2025 10:33) (110/63 - 145/74)  BP(mean): --  RR: 18 (27 Mar 2025 10:33) (16 - 18)  SpO2: 94% (27 Mar 2025 10:33) (94% - 99%)    Parameters below as of 27 Mar 2025 10:33  Patient On (Oxygen Delivery Method): room air        PHYSICAL EXAM:    HEAD:  Atraumatic, Normocephalic  EYES: EOMI, PERRLA, conjunctiva and sclera clear  ENMT: No tonsillar erythema, exudates, or enlargement; Moist mucous membranes, Good dentition, No lesions  NECK: Supple, No JVD, Normal thyroid      LABS:                        14.6   11.89 )-----------( 285      ( 27 Mar 2025 07:45 )             45.1     03-27    137  |  106  |  24[H]  ----------------------------<  208[H]  4.3   |  23  |  1.14    Ca    9.9      27 Mar 2025 07:45        Urinalysis Basic - ( 27 Mar 2025 07:45 )    Color: x / Appearance: x / SG: x / pH: x  Gluc: 208 mg/dL / Ketone: x  / Bili: x / Urobili: x   Blood: x / Protein: x / Nitrite: x   Leuk Esterase: x / RBC: x / WBC x   Sq Epi: x / Non Sq Epi: x / Bacteria: x      CAPILLARY BLOOD GLUCOSE      POCT Blood Glucose.: 193 mg/dL (27 Mar 2025 07:47)  POCT Blood Glucose.: 166 mg/dL (26 Mar 2025 21:15)  POCT Blood Glucose.: 245 mg/dL (26 Mar 2025 16:42)  POCT Blood Glucose.: 222 mg/dL (26 Mar 2025 11:24)

## 2025-03-27 NOTE — PROGRESS NOTE ADULT - ASSESSMENT
A/P:    Left epididymoorchitis with large complex left hydrocele/pyocele  On ceftriaxone  Cultures growing strep   Gc -ve  ID and urology following  afebrile    BRENDAN  Resolved      Chronic medical conditions:  HTN: PTA metoprolol succinate 100 mg qhs, olmesartan 40 mg held due to BRENDAN  NIDDM2 with hyperglycemia: blood glucose 209 on admission, POC qac and qhs, PTA metformin 500 mg BID held, low dose SSI qac started, blood glucose goal < 180, f/u A1c  Hypothyroidism: PTA levothyroxine 175 mcg  BPH: PTA tamsulosin 0.4 mg    Dispo: DC tomorrow.     Rashaad Kurtz MD

## 2025-03-28 ENCOUNTER — TRANSCRIPTION ENCOUNTER (OUTPATIENT)
Age: 75
End: 2025-03-28

## 2025-03-28 VITALS
RESPIRATION RATE: 18 BRPM | DIASTOLIC BLOOD PRESSURE: 84 MMHG | TEMPERATURE: 97 F | OXYGEN SATURATION: 95 % | HEART RATE: 65 BPM | SYSTOLIC BLOOD PRESSURE: 158 MMHG

## 2025-03-28 LAB
GLUCOSE BLDC GLUCOMTR-MCNC: 168 MG/DL — HIGH (ref 70–99)
GLUCOSE BLDC GLUCOMTR-MCNC: 238 MG/DL — HIGH (ref 70–99)
GLUCOSE BLDC GLUCOMTR-MCNC: 250 MG/DL — HIGH (ref 70–99)
HCT VFR BLD CALC: 43.3 % — SIGNIFICANT CHANGE UP (ref 39–50)
HGB BLD-MCNC: 14.1 G/DL — SIGNIFICANT CHANGE UP (ref 13–17)
MCHC RBC-ENTMCNC: 27.5 PG — SIGNIFICANT CHANGE UP (ref 27–34)
MCHC RBC-ENTMCNC: 32.6 G/DL — SIGNIFICANT CHANGE UP (ref 32–36)
MCV RBC AUTO: 84.6 FL — SIGNIFICANT CHANGE UP (ref 80–100)
NON-GYNECOLOGICAL CYTOLOGY STUDY: SIGNIFICANT CHANGE UP
NRBC BLD AUTO-RTO: 0 /100 WBCS — SIGNIFICANT CHANGE UP (ref 0–0)
PLATELET # BLD AUTO: 323 K/UL — SIGNIFICANT CHANGE UP (ref 150–400)
RBC # BLD: 5.12 M/UL — SIGNIFICANT CHANGE UP (ref 4.2–5.8)
RBC # FLD: 14.1 % — SIGNIFICANT CHANGE UP (ref 10.3–14.5)
WBC # BLD: 11.01 K/UL — HIGH (ref 3.8–10.5)
WBC # FLD AUTO: 11.01 K/UL — HIGH (ref 3.8–10.5)

## 2025-03-28 PROCEDURE — 36410 VNPNXR 3YR/> PHY/QHP DX/THER: CPT

## 2025-03-28 PROCEDURE — 99232 SBSQ HOSP IP/OBS MODERATE 35: CPT

## 2025-03-28 PROCEDURE — 99233 SBSQ HOSP IP/OBS HIGH 50: CPT

## 2025-03-28 PROCEDURE — 76937 US GUIDE VASCULAR ACCESS: CPT | Mod: 26

## 2025-03-28 RX ORDER — POLYETHYLENE GLYCOL 3350 17 G/17G
17 POWDER, FOR SOLUTION ORAL
Qty: 0 | Refills: 0 | DISCHARGE
Start: 2025-03-28

## 2025-03-28 RX ORDER — SENNA 187 MG
2 TABLET ORAL
Qty: 0 | Refills: 0 | DISCHARGE
Start: 2025-03-28

## 2025-03-28 RX ORDER — FINASTERIDE 1 MG/1
1 TABLET, FILM COATED ORAL
Qty: 30 | Refills: 0
Start: 2025-03-28 | End: 2025-04-26

## 2025-03-28 RX ADMIN — INSULIN LISPRO 2: 100 INJECTION, SOLUTION INTRAVENOUS; SUBCUTANEOUS at 17:41

## 2025-03-28 RX ADMIN — Medication 175 MICROGRAM(S): at 06:32

## 2025-03-28 RX ADMIN — Medication 20 MILLIGRAM(S): at 12:17

## 2025-03-28 RX ADMIN — INSULIN LISPRO 1: 100 INJECTION, SOLUTION INTRAVENOUS; SUBCUTANEOUS at 08:08

## 2025-03-28 RX ADMIN — Medication 81 MILLIGRAM(S): at 12:17

## 2025-03-28 RX ADMIN — FINASTERIDE 5 MILLIGRAM(S): 1 TABLET, FILM COATED ORAL at 12:17

## 2025-03-28 RX ADMIN — CEFTRIAXONE 100 MILLIGRAM(S): 500 INJECTION, POWDER, FOR SOLUTION INTRAMUSCULAR; INTRAVENOUS at 06:30

## 2025-03-28 RX ADMIN — LIDOCAINE HYDROCHLORIDE 1 PATCH: 20 JELLY TOPICAL at 12:18

## 2025-03-28 RX ADMIN — INSULIN LISPRO 2: 100 INJECTION, SOLUTION INTRAVENOUS; SUBCUTANEOUS at 12:15

## 2025-03-28 RX ADMIN — Medication 2 TABLET(S): at 06:31

## 2025-03-28 NOTE — DISCHARGE NOTE PROVIDER - HOSPITAL COURSE
75 year old male with PMHx of HTN, NIDDM2, hypothyroidism, and BPH who presented to the ED on 3/24/25 for complaints of left testicular pain and swelling.       Left epididymoorchitis with large complex left hydrocele/pyocele  On ceftriaxone  Cultures growing strep   Gc -ve  ID and urology following  afebrile    BRENDAN  Resolved      Chronic medical conditions:  HTN: PTA metoprolol succinate 100 mg qhs, olmesartan 40 mg held due to BRENDAN  NIDDM2 with hyperglycemia: blood glucose 209 on admission, POC qac and qhs, PTA metformin 500 mg BID held, low dose SSI qac started, blood glucose goal < 180, f/u A1c  Hypothyroidism: PTA levothyroxine 175 mcg  BPH: PTA tamsulosin 0.4 mg   75 year old male with PMHx of HTN, NIDDM2, hypothyroidism, and BPH who presented to the ED on 3/24/25 for complaints of left testicular pain and swelling.       Left epididymoorchitis with large complex left hydrocele/pyocele  On ceftriaxone through 4/1 with midline  Cultures growing strep   Gc -ve  ID and urology following  afebrile    BRENDAN  Resolved      Chronic medical conditions:  HTN: PTA metoprolol succinate 100 mg qhs, olmesartan 40 mg held due to BRENDAN  NIDDM2 with hyperglycemia: blood glucose 209 on admission, POC qac and qhs, PTA metformin 500 mg BID held, low dose SSI qac started, blood glucose goal < 180, f/u A1c  Hypothyroidism: PTA levothyroxine 175 mcg  BPH: PTA tamsulosin 0.4 mg

## 2025-03-28 NOTE — DISCHARGE NOTE PROVIDER - CARE PROVIDER_API CALL
Aileen Oh  Infectious Disease  [address] [place] [address]  Phone: [phone]  Fax: [phone]  Follow Up Time:    Aileen Oh  Infectious Disease  [address] [place] [id number]  Phone: [phone]  Fax: [phone]  Follow Up Time:     Cyrus Angel  Urology  [address] [facility]  [place] [address]  Phone: [phone]  Fax: [phone]  Follow Up Time:

## 2025-03-28 NOTE — PROGRESS NOTE ADULT - SUBJECTIVE AND OBJECTIVE BOX
Patient seen and examined bedside resting comfortably.  [facility]  Juan Pablo ([id number])  Reports some continued scrotal pain.  Smith in place with clear yellow urine draining.  Denies nausea and vomiting. Tolerating diet.  Denies chest pain, dyspnea, cough.    T(F): 97.7 (03-28-25 @ 10:51), Max: 98.9 (03-28-25 @ 00:23)  HR: 68 (03-28-25 @ 10:51) (64 - 75)  BP: 144/73 (03-28-25 @ 10:51) (127/74 - 146/79)  RR: 17 (03-28-25 @ 10:51) (17 - 19)  SpO2: 95% (03-28-25 @ 10:51) (94% - 96%)  Wt(kg): --  CAPILLARY BLOOD GLUCOSE      POCT Blood Glucose.: 238 mg/dL (28 Mar 2025 11:27)  POCT Blood Glucose.: 168 mg/dL (28 Mar 2025 08:01)  POCT Blood Glucose.: 175 mg/dL (27 Mar 2025 16:35)      PHYSICAL EXAM:  General: NAD, alert and awake  HEENT: NCAT, EOMI, conjunctiva clear  Chest: nonlabored respirations, good inspiratory effort  Abdomen: soft, NTND.   Extremities: no pedal edema or calf tenderness noted   : smith catheter in place draining clear yellow urine, testicles TTP    LABS:                        14.1   11.01 )-----------( 323      ( 28 Mar 2025 07:49 )             43.3   03-27    137  |  106  |  24[H]  ----------------------------<  208[H]  4.3   |  23  |  1.14    Ca    9.9      27 Mar 2025 07:45      I&O's Detail    27 Mar 2025 07:01  -  28 Mar 2025 07:00  --------------------------------------------------------  IN:  Total IN: 0 mL    OUT:    Indwelling Catheter - Urethral (mL): 1600 mL  Total OUT: 1600 mL    Total NET: -1600 mL

## 2025-03-28 NOTE — PROGRESS NOTE ADULT - SUBJECTIVE AND OBJECTIVE BOX
Rehab Medicine         Patient:  Arthur Jimenez Date:  2019   :  1941 Attending:  Jackie Benson MD   77 year old male       Primary Rehabilitation Diagnosis: CVA   Expected Discharge Date: 19(no reconference)   Planned Discharge Destination: Home    Subjective: Patient is having difficulty even reading big prints, getting a little better today.    Visual field cut is unchanged. Rest of his review of system is unremarkable per patient    Reviewed:  Allergies, Medical History, Surgical History and Medications      Vital Last Value 24 Hour Range   Temperature 97.5 °F (36.4 °C) (19) Temp  Min: 97.5 °F (36.4 °C)  Max: 97.5 °F (36.4 °C)   Pulse 60 (19) Pulse  Min: 60  Max: 60   Respiratory 18 (19) Resp  Min: 18  Max: 18   Non-Invasive  Blood Pressure 130/72 (19 0754) BP  Min: 130/72  Max: 130/72   Pulse Oximetry 99 % (19) SpO2  Min: 99 %  Max: 99 %     Vital Today Admit   Weight 64.8 kg (19 0242) Weight: 67 kg (19 1115)   Height N/A Height: 5' 6\" (167.6 cm) (19 1115)   BMI N/A BMI (Calculated): 23.82 (19 1115)     Weight over the past 48 Hours:  No data found.     Intake/Output:  Last Stool Occurrence:  1(per patient) (19 1000)  No intake/output data recorded.  I/O last 3 completed shifts:  In: 536 [P.O.:536]  Out: -     Intake/Output Summary (Last 24 hours) at 2019 1601  Last data filed at 2019 1218  Gross per 24 hour   Intake 536 ml   Output --   Net 536 ml       Nguyễn:  No    PHYSICAL EXAM:  Constitutional:  NAD, patient speaks freely in full sentences.  Patient is well groomed.  Neck:  Trachea midline, supple.  Cardiovascular:  RRR, normal S1, S2.  Respiratory:  Normal rate.  No retractions or increased work of breathing.  Clear to auscultation bilaterally.  Gastrointestinal:  +BS, non-distended, soft, non-tender.  Genitourinary:  Deferred.  Musculoskeletal:   No edema or calf tenderness.  Neurologic:  He  Patient is a 75y old  Male who presents with a chief complaint of Left epididymoorchitis with large complex left hydrocele/pyocele (28 Mar 2025 10:51)      INTERVAL HPI/OVERNIGHT EVENTS: No acute events overnight, afebrile.     MEDICATIONS  (STANDING):  aspirin  chewable 81 milliGRAM(s) Oral daily  cefTRIAXone   IVPB 2000 milliGRAM(s) IV Intermittent every 24 hours  dextrose 5%. 1000 milliLiter(s) (100 mL/Hr) IV Continuous <Continuous>  dextrose 5%. 1000 milliLiter(s) (50 mL/Hr) IV Continuous <Continuous>  dextrose 50% Injectable 25 Gram(s) IV Push once  dextrose 50% Injectable 12.5 Gram(s) IV Push once  dextrose 50% Injectable 25 Gram(s) IV Push once  famotidine    Tablet 20 milliGRAM(s) Oral daily  finasteride 5 milliGRAM(s) Oral daily  glucagon  Injectable 1 milliGRAM(s) IntraMuscular once  insulin lispro (ADMELOG) corrective regimen sliding scale   SubCutaneous three times a day before meals  levothyroxine 175 MICROGram(s) Oral daily  lidocaine   4% Patch 1 Patch Transdermal daily  metoprolol succinate  milliGRAM(s) Oral at bedtime  polyethylene glycol 3350 17 Gram(s) Oral two times a day  senna 2 Tablet(s) Oral two times a day  tamsulosin 0.4 milliGRAM(s) Oral at bedtime    MEDICATIONS  (PRN):  acetaminophen     Tablet .. 650 milliGRAM(s) Oral every 6 hours PRN Temp greater or equal to 38C (100.4F), Mild Pain (1 - 3)  aluminum hydroxide/magnesium hydroxide/simethicone Suspension 30 milliLiter(s) Oral every 4 hours PRN Dyspepsia  dextrose Oral Gel 15 Gram(s) Oral once PRN Blood Glucose LESS THAN 70 milliGRAM(s)/deciliter  melatonin 3 milliGRAM(s) Oral at bedtime PRN Insomnia  ondansetron Injectable 4 milliGRAM(s) IV Push every 8 hours PRN Nausea and/or Vomiting      Allergies    No Known Allergies    Intolerances        REVIEW OF SYSTEMS:  CONSTITUTIONAL: +ve for fatigue  EYES: No eye pain, visual disturbances, or discharge  ENMT:  No difficulty hearing, tinnitus, vertigo; No sinus or throat pain  NECK: No pain or stiffness      Vital Signs Last 24 Hrs  T(C): 36.5 (28 Mar 2025 10:51), Max: 37.2 (28 Mar 2025 00:23)  T(F): 97.7 (28 Mar 2025 10:51), Max: 98.9 (28 Mar 2025 00:23)  HR: 68 (28 Mar 2025 10:51) (64 - 75)  BP: 144/73 (28 Mar 2025 10:51) (127/74 - 146/79)  BP(mean): --  RR: 17 (28 Mar 2025 10:51) (17 - 19)  SpO2: 95% (28 Mar 2025 10:51) (94% - 96%)    Parameters below as of 28 Mar 2025 10:51  Patient On (Oxygen Delivery Method): room air        PHYSICAL EXAM:    HEAD:  Atraumatic, Normocephalic  EYES: EOMI, PERRLA, conjunctiva and sclera clear  ENMT: No tonsillar erythema, exudates, or enlargement; Moist mucous membranes, Good dentition, No lesions      LABS:                        14.1   11.01 )-----------( 323      ( 28 Mar 2025 07:49 )             43.3     03-27    137  |  106  |  24[H]  ----------------------------<  208[H]  4.3   |  23  |  1.14    Ca    9.9      27 Mar 2025 07:45        Urinalysis Basic - ( 27 Mar 2025 07:45 )    Color: x / Appearance: x / SG: x / pH: x  Gluc: 208 mg/dL / Ketone: x  / Bili: x / Urobili: x   Blood: x / Protein: x / Nitrite: x   Leuk Esterase: x / RBC: x / WBC x   Sq Epi: x / Non Sq Epi: x / Bacteria: x      CAPILLARY BLOOD GLUCOSE      POCT Blood Glucose.: 238 mg/dL (28 Mar 2025 11:27)  POCT Blood Glucose.: 168 mg/dL (28 Mar 2025 08:01)  POCT Blood Glucose.: 175 mg/dL (27 Mar 2025 16:35)       has a right visual field cuts, unchanged. Difficulty with reading even his schedule, big prints, able to read physical therapy but unable to reach recreational therapy.   Psychiatric:  Affect and mood appropriate.  The patient is alert, interactive, appropriate.    Laboratory Results:  No results found    Imaging:      Other:      Current Functional status over the last 24 hours:    Nursing Skin Documentation:   Integumentary Assessment: Exceptions to WDL (04/04/19 0751)   Bladder FIM Documentation:      Score: 7-Independent : Pt demonstrates complete independence with bladder management, no accidents (04/04/19 1300)                  Bowel FIM Documentation:     Score: 7-Independently : Pt demonstrates complete independence with bowel management, no accidents (04/04/19 1300)                  Pain Documentation:   Pain Assessment: Within defined limits (04/04/19 1401)   Mobility Documentation:   ,    Sit to Stand: Independent (04/04/19 1401), Stand to Sit: Independent (04/04/19 1401),    Gait Assistance: Modified Independent (04/04/19 0825), Assistive Device/: Gait Belt (04/04/19 0825), Ambulation Distance (Feet): 400 Feet (04/04/19 0825)   , Stair Management Assistance: Modified Independent (04/04/19 0825)   Selfcare Documentation:     Grooming Assistance: Modified independent (04/04/19 1300)  Bathing Assistance: Modified independent (04/04/19 1300)  Upper Body Dressing Assistance: Modified independent;Standing at sink (04/04/19 1300)  Lower Body Clothing Assistance: Modified independent (04/04/19 1300)      Communication/Cognition/Swallowing Documentation:   ,        ,     ,        Swallow/Feeding Tips: None (04/04/19 1100)     Scheduled Medications:  • aspirin  325 mg Oral Daily   • escitalopram  10 mg Oral Daily   • simvastatin  40 mg Oral Nightly   • famotidine  20 mg Oral 2 times per day       PRN  • sodium chloride (NORMAL SALINE) 0.9 % bolus 500 mL  500 mL Intravenous PRN For total dose see MAR    • acetaminophen (TYLENOL) tablet 650 mg  650 mg Oral Q4H PRN For total dose see MAR    Or   • acetaminophen (TYLENOL) suppository 650 mg  650 mg Rectal Q4H PRN For total dose see MAR   • bisacodyl (DULCOLAX) suppository 10 mg  10 mg Rectal Daily PRN For total dose see MAR   • docusate sodium-sennosides (SENOKOT S) 50-8.6 MG 2 tablet  2 tablet Oral Daily PRN For total dose see MAR       Continuous infusion      Patient Vitals for the past 48 hrs:   BP Pulse   04/02/19 2021 120/70 64   04/03/19 0500 -- 64   04/03/19 0738 139/83 --   04/03/19 1532 -- 76   04/04/19 0524 -- 60   04/04/19 0754 130/72 --       No results available in last 24 hours    Patient Active Problem List   Diagnosis   • Other and unspecified hyperlipidemia   • Impaired fasting glucose   • Impotence of organic origin   • Esophageal reflux   • Hypertension   • Benign non-nodular prostatic hyperplasia with lower urinary tract symptoms   • Acute CVA (cerebrovascular accident) (CMS/[facility])       Radiology:  CT HEAD BRAIN    Results for orders placed during the hospital encounter of 03/22/19   CT HEAD BRAIN    Impression IMPRESSION:  1. There is ongoing evolution of the patient's recent infarct in the left  PCA distribution.    2. There is asymmetric atrophy in the left perisylvian region.       CT Head Level 1   Results for orders placed during the hospital encounter of 03/22/19   CT Head Level 1    Impression IMPRESSION:  Hypodensity in the left posterior cerebral artery territory  and thalamus compatible with evolving left posterior cerebral artery  territory infarct.  No significant mass effect or midline shift.    Findings were called immediately to GINGER WEISS MD on 3/22/2019 10:26  AM.      //Location Code: [facility]       MRI Brain   Results for orders placed during the hospital encounter of 03/22/19   MRI Brain    Impression IMPRESSION:    1.  Large, acute infarct in the distribution of the left PCA. There is mass  effect in the form of sulcal  effacement.  2.  No additional infarcts are present in the brain.  3.  There is evidence of a moderate-sized arachnoid cyst in the left  sylvian fissure.  4.  Minor microangiopathic changes of aging.       US Carotid Duplex Bilateral  No results found for this or any previous visit.  US Chest AP or PA   Results for orders placed during the hospital encounter of 03/22/19   XR Chest AP or PA    Impression IMPRESSION: Stable appearance of the chest, without evidence of acute  failure or infiltrate.      No results found for this or any previous visit.                   Quality Indicators       DVT/VTE Prophylaxis:  VTE Pharmacologic Prophylaxis:  No pharmacologic Venous Thromboembolism prophylaxis due to Patient fully ambulating and deemed to be low risk  VTE Mechanical Prophylaxis:  Yes        Impressions/Plan/DC Plan:  1. Functional deficits requiring acute inpatient rehabilitation due to acute CVA in the distribution of the left PCA.  - ,  the neurologist was on board.  Patient had a follow-up head CTand showed evolution of the patient's recent infarct in the left PCA distribution  : had a long D/W pt and Bridget, his SO yesterday.  Patient will not be able to manage his medication. Patient's SO will be able to help him with his medication.  She is finding out that he can't even read back his writing.  Care giver education in progress. D/C on 4/5 with OP neuro rehab at [facility].  -Will make him up ad isidra. in the room with no device    2. Right  visual field cuts and difficulty with reading;  Dr. Franco, neuro ophthalmologist did see the patient today and will have follow-up in about 6 weeks.     3. Esophageal reflux.:  Continue the Pepcid 20 mg b.i.d.     4. History of depression: On Lexapro 10 mg daily and doing well.       Case discussed with patient and staff.    Jackie Benson MD  4/4/2019  4:01 PM

## 2025-03-28 NOTE — PROGRESS NOTE ADULT - ASSESSMENT
A/P:    Left epididymoorchitis with large complex left hydrocele/pyocele  On ceftriaxone - needs 7 days total.   Cultures growing strep   Gc -ve  ID and urology following  Midline in place  afebrile    Urinary retention:  - urology followup outpatient  - discharge with smith     BRENDAN  Resolved      Chronic medical conditions:  HTN: PTA metoprolol succinate 100 mg qhs, olmesartan 40 mg held due to BRENDAN  NIDDM2 with hyperglycemia: blood glucose 209 on admission, POC qac and qhs, PTA metformin 500 mg BID held, low dose SSI qac started, blood glucose goal < 180, f/u A1c  Hypothyroidism: PTA levothyroxine 175 mcg  BPH: PTA tamsulosin 0.4 mg    Dispo: DC with home IV antibiotics one there is an accepting agency.     Rashaad Kurtz MD

## 2025-03-28 NOTE — DISCHARGE NOTE PROVIDER - CARE PROVIDERS DIRECT ADDRESSES
,[email or web address],DirectAddress_Unknown

## 2025-03-28 NOTE — DISCHARGE NOTE PROVIDER - PROVIDER TOKENS
PROVIDER:[TOKEN:[[id number]]] PROVIDER:[TOKEN:[[id number]]],PROVIDER:[TOKEN:[[id number]]]

## 2025-03-28 NOTE — PROGRESS NOTE ADULT - PROVIDER SPECIALTY LIST ADULT
Hospitalist
Infectious Disease
Urology
Hospitalist
Infectious Disease
Urology
Infectious Disease
Infectious Disease
Urology
Urology

## 2025-03-28 NOTE — DISCHARGE NOTE PROVIDER - NSDCMRMEDTOKEN_GEN_ALL_CORE_FT
aspirin 81 mg oral tablet: 1 tab(s) orally once a day  cefTRIAXone 2 g injection: 2 gram(s) intravenous once a day Ceftriaxone 2 grams IV q 24 hrs - last dose 4/1/25  no need for lab work  follow up with ID in the office  remove midline upon completion of the course of abx  saline flushes  famotidine 20 mg oral tablet: 1 tab(s) orally 2 times a day  finasteride 5 mg oral tablet: 1 tab(s) orally once a day  levothyroxine 175 mcg (0.175 mg) oral tablet: 1 tab(s) orally once a day  metFORMIN 500 mg oral tablet: 1 tab(s) orally 2 times a day  metoprolol succinate 100 mg oral tablet, extended release: 1 tab(s) orally once a day (at bedtime)  olmesartan 40 mg oral tablet: 1 tab(s) orally once a day takes in AM  polyethylene glycol 3350 oral powder for reconstitution: 17 gram(s) orally 2 times a day  senna leaf extract oral tablet: 2 tab(s) orally 2 times a day  tamsulosin 0.4 mg oral capsule: 1 cap(s) orally once a day (at bedtime)

## 2025-03-28 NOTE — PROGRESS NOTE ADULT - NS ATTEND OPT1 GEN_ALL_CORE
I attest my time as attending is greater than 50% of the total combined time spent on qualifying patient care activities by the PA/NP and attending.
I independently performed the documented:
I attest my time as attending is greater than 50% of the total combined time spent on qualifying patient care activities by the PA/NP and attending.
I independently performed the documented:
I attest my time as attending is greater than 50% of the total combined time spent on qualifying patient care activities by the PA/NP and attending.
I independently performed the documented:

## 2025-03-28 NOTE — PROGRESS NOTE ADULT - NS ATTEND AMEND GEN_ALL_CORE FT
All labs and cultures and imaging and pertinent chart notes reviewed by me.    case d/w Np Blanca at length and agree with her assessment and plan.    3/26:   afebrile,  wbc- 13.73  UC grew Streptococcus group B, remains with Springer, no significant change in pt's scrotal swelling and discomfort since yesterday, Ceftriaxone IV continued (day #3).   urine GC/chlamydia- negative      Impression-  +leukocytosis  BPH  JEAN CLAUDE -mild  Left epididymoorchitis with large complex left hydrocele/pyocele    Plan-  continue abx, ceftriaxone IV (day #3)  Springer management per urology  rest of the medical management as per medicine  team     Aileen Oh MD  Infectious Disease Attending    for any questions please do not hesitate to contact me either via teams or by calling [phone]
I agree with the statements above
Keep tamsulosin  Reports retention in past,  Though not accurate, get baseline PSA and start finasteride  F.u cx and clinical exam  Normal Cr, so can get CTU for intermittent hematuria in recent past; yellow now  out pt cystoscopy
Seen and agree w above.  Complete abx Keep Springer
All labs and cultures and imaging and pertinent chart notes reviewed by me.    case d/w Np Blanca at length and agree with her assessment and plan.    3/27:  improving  , no fevers,   leukocytosis is better 11.89  urine cx- > 100k strep agalactea      Impression-  +leukocytosis  BPH  JEAN CLAUDE -mild  Left epididymoorchitis with large complex left hydrocele/pyocele    Plan-  continue abx, ceftriaxone 2 grams IV daily (day #3), needs 4 more days to complete 7 days   follow all culture data   urine GC /chlamydia - negative   Springer management per urology  Scrotal elevation  rest of the medical management as per medicine  team  follow up with  as outpatient  advised as well.    when ready for discharge:  place a midline  continue ceftriaxone 2 grams IV q 24 hrs - last dose 4/1/2025  no need for lab work  follow up with ID:  [address] [place] [address]  [phone]  remove midline upon completion of the course of abx      Aileen Oh MD  Infectious Disease Attending    for any questions please do not hesitate to contact me either via teams or by calling [phone]
All labs and cultures and imaging and pertinent chart notes reviewed by me.    case d/w Np Blanca at length and agree with her assessment and plan.      3/25: no fever, RA, leukocytosis without change 13.56,   Urine cx->100k strep agalactea    GC Chlamydia negative, CT a/p - constipation, Bladder wall thickening. Pt's scrotal area with edema and tender to touch, abx continued.      Impression-  +leukocytosis  BPH  JEAN CLAUDE -mild  Left epididymoorchitis with large complex left hydrocele/pyocele- patient is >35 years of age and  and hence doubt GC/chlmaydia as the etiology as the etiology of the epididymorchitis, however, will need to rule it out still    Plan-  continue abx, ceftriaxone IV (day #2) treast strep agalactea  d/c doxy  urine GC /chlamydia - negative   Urology input is appreciated   Springer management per urology  Scrotal elevation  trend wbc  rest of the medical management as per medicine  team      Aileen Oh MD  Infectious Disease Attending    for any questions please do not hesitate to contact me either via teams or by calling [phone]

## 2025-03-28 NOTE — PROGRESS NOTE ADULT - ASSESSMENT
75M w/ PMHx DM, HTN, BPD admitted with left epididymoorchitis, BRENDAN.  Smith catheter in place 2/2 urinary retention w/ 77g prostate, BRENDAN resolved.  UCx strep agalactiae, leukocytosis downtrending, afebrile.  PSA 2.65.  Midline placed by surgical service this AM for continued IV antibiotics.    Plan:  - Continue antibiotics per ID  - Continue smith catheter upon discharge w/ leg bag, should f/u outpatient with Dr. Mustafa in 1 week  - Continue flomax and finasteride  - Continue scrotal elevation, analgesics PRN  - Continue care per primary team

## 2025-03-28 NOTE — PROCEDURE NOTE - ADDITIONAL PROCEDURE DETAILS
Patient seen at bedside for midline catheter placement.  Consent obtained from patient after risks/benefits/alternatives explained.   Upper extremity prepped and draped in usual sterile fashion. Time out performed with RN. 4Fr 12 cm single lumen midline catheter placed using ultrasound guided seldinger technique, R basilic vein. Flushes well, with good venous return. Patient tolerated procedure well without complication and was left in no acute distress. Upper arm circumference noted to be 25cm

## 2025-03-28 NOTE — PROGRESS NOTE ADULT - REASON FOR ADMISSION
Left epididymoorchitis with large complex left hydrocele/pyocele

## 2025-03-28 NOTE — DISCHARGE NOTE PROVIDER - NSDCCPCAREPLAN_GEN_ALL_CORE_FT
PRINCIPAL DISCHARGE DIAGNOSIS  Diagnosis: Orchitis, epididymitis, and epididymo-orchitis  Assessment and Plan of Treatment: Continue and complete antibiotics.  Follow up with your PCP within 2 weeks of discharge.  Also follow up with infectious disease within 2 weeks of discharge.     PRINCIPAL DISCHARGE DIAGNOSIS  Diagnosis: Orchitis, epididymitis, and epididymo-orchitis  Assessment and Plan of Treatment: Continue and complete antibiotics.  Continue smith, does not need to be flushsed.  Follow up with Dr Angel, urology within 1 week.  Follow up with your PCP within 2 weeks of discharge.  Also follow up with infectious disease within 2 weeks of discharge.

## 2025-03-28 NOTE — DISCHARGE NOTE NURSING/CASE MANAGEMENT/SOCIAL WORK - FINANCIAL ASSISTANCE
[facility] provides services at a reduced cost to those who are determined to be eligible through [facility]’s financial assistance program. Information regarding [facility]’s financial assistance program can be found by going to [email or web address] or by calling [phone].

## 2025-03-28 NOTE — DISCHARGE NOTE NURSING/CASE MANAGEMENT/SOCIAL WORK - CAREGIVER RELATION TO PATIENT
CHIEF COMPLAINT:   Patient presents with:  Sinus Problem      HPI:   Sabina Morris is a 31year old female who presents for cold symptoms for  2  weeks. Symptoms have progressed into sinus congestion and been worsening since onset.  Sinus congestion/pain Disp:  Rfl:       Past Medical History:   Diagnosis Date   • Abnormal Pap smear of cervix    • Anxiety state, unspecified    • Asthma    • Decorative tattoo    • Depression    • Endometriosis    • Human papilloma virus infection    • Varicella     Had chi and swollen bilateral turbinates  THROAT: oral mucosa pink, moist. No visible dental caries. Posterior pharynx is non erythematous. NECK: supple, non-tender  LUNGS: Breathing is non labored. Lungs clear to auscultation bilaterally, no wheezes or rhonchi. Daughter

## 2025-03-28 NOTE — DISCHARGE NOTE NURSING/CASE MANAGEMENT/SOCIAL WORK - PATIENT PORTAL LINK FT
You can access the FollowMyHealth Patient Portal offered by [facility] by registering at the following website: [email or web address]. By joining [facility]’s FollowMyHealth portal, you will also be able to view your health information using other applications (apps) compatible with our system.

## 2025-04-02 DIAGNOSIS — N40.1 BENIGN PROSTATIC HYPERPLASIA WITH LOWER URINARY TRACT SYMPTOMS: ICD-10-CM

## 2025-04-02 DIAGNOSIS — E11.65 TYPE 2 DIABETES MELLITUS WITH HYPERGLYCEMIA: ICD-10-CM

## 2025-04-02 DIAGNOSIS — B95.4 OTHER STREPTOCOCCUS AS THE CAUSE OF DISEASES CLASSIFIED ELSEWHERE: ICD-10-CM

## 2025-04-02 DIAGNOSIS — Z79.82 LONG TERM (CURRENT) USE OF ASPIRIN: ICD-10-CM

## 2025-04-02 DIAGNOSIS — N45.3 EPIDIDYMO-ORCHITIS: ICD-10-CM

## 2025-04-02 DIAGNOSIS — I10 ESSENTIAL (PRIMARY) HYPERTENSION: ICD-10-CM

## 2025-04-02 DIAGNOSIS — Z79.890 HORMONE REPLACEMENT THERAPY: ICD-10-CM

## 2025-04-02 DIAGNOSIS — K59.00 CONSTIPATION, UNSPECIFIED: ICD-10-CM

## 2025-04-02 DIAGNOSIS — N43.2 OTHER HYDROCELE: ICD-10-CM

## 2025-04-02 DIAGNOSIS — E87.20 ACIDOSIS, UNSPECIFIED: ICD-10-CM

## 2025-04-02 DIAGNOSIS — E03.9 HYPOTHYROIDISM, UNSPECIFIED: ICD-10-CM

## 2025-04-02 DIAGNOSIS — Z79.84 LONG TERM (CURRENT) USE OF ORAL HYPOGLYCEMIC DRUGS: ICD-10-CM

## 2025-04-02 DIAGNOSIS — N17.9 ACUTE KIDNEY FAILURE, UNSPECIFIED: ICD-10-CM

## 2025-04-02 DIAGNOSIS — R33.8 OTHER RETENTION OF URINE: ICD-10-CM

## 2025-04-02 PROBLEM — E11.9 TYPE 2 DIABETES MELLITUS WITHOUT COMPLICATIONS: Chronic | Status: ACTIVE | Noted: 2025-03-24

## 2025-04-02 PROBLEM — N40.0 BENIGN PROSTATIC HYPERPLASIA WITHOUT LOWER URINARY TRACT SYMPTOMS: Chronic | Status: ACTIVE | Noted: 2025-03-24

## 2025-04-03 PROBLEM — Z00.00 ENCOUNTER FOR PREVENTIVE HEALTH EXAMINATION: Status: ACTIVE | Noted: 2025-04-03

## 2025-04-11 ENCOUNTER — APPOINTMENT (OUTPATIENT)
Dept: UROLOGY | Facility: CLINIC | Age: 75
End: 2025-04-11
Payer: MEDICARE

## 2025-04-11 VITALS
OXYGEN SATURATION: 96 % | SYSTOLIC BLOOD PRESSURE: 105 MMHG | BODY MASS INDEX: 26.52 KG/M2 | DIASTOLIC BLOOD PRESSURE: 62 MMHG | HEART RATE: 63 BPM | TEMPERATURE: 97.7 F | HEIGHT: 66 IN | WEIGHT: 165 LBS

## 2025-04-11 DIAGNOSIS — Z78.9 OTHER SPECIFIED HEALTH STATUS: ICD-10-CM

## 2025-04-11 DIAGNOSIS — N45.2 ORCHITIS: ICD-10-CM

## 2025-04-11 DIAGNOSIS — N40.0 BENIGN PROSTATIC HYPERPLASIA WITHOUT LOWER URINARY TRACT SYMPMS: ICD-10-CM

## 2025-04-11 DIAGNOSIS — N45.3 EPIDIDYMO-ORCHITIS: ICD-10-CM

## 2025-04-11 DIAGNOSIS — Z80.1 FAMILY HISTORY OF MALIGNANT NEOPLASM OF TRACHEA, BRONCHUS AND LUNG: ICD-10-CM

## 2025-04-11 PROCEDURE — 99212 OFFICE O/P EST SF 10 MIN: CPT | Mod: 25

## 2025-04-11 PROCEDURE — 51798 US URINE CAPACITY MEASURE: CPT

## 2025-04-11 RX ORDER — METFORMIN HYDROCHLORIDE 750 MG/1
TABLET ORAL
Refills: 0 | Status: ACTIVE | COMMUNITY

## 2025-04-11 RX ORDER — FINASTERIDE 1 MG/1
TABLET ORAL
Refills: 0 | Status: ACTIVE | COMMUNITY

## 2025-04-11 RX ORDER — TAMSULOSIN HYDROCHLORIDE 0.4 MG/1
CAPSULE ORAL
Refills: 0 | Status: ACTIVE | COMMUNITY

## 2025-04-11 RX ORDER — CHROMIUM 200 MCG
TABLET ORAL
Refills: 0 | Status: ACTIVE | COMMUNITY

## 2025-04-11 RX ORDER — ASPIRIN 81 MG
81 TABLET, DELAYED RELEASE (ENTERIC COATED) ORAL
Refills: 0 | Status: ACTIVE | COMMUNITY

## 2025-04-11 RX ORDER — METOPROLOL TARTRATE 75 MG/1
TABLET, FILM COATED ORAL
Refills: 0 | Status: ACTIVE | COMMUNITY

## 2025-04-16 ENCOUNTER — NON-APPOINTMENT (OUTPATIENT)
Age: 75
End: 2025-04-16

## 2025-04-16 DIAGNOSIS — N45.3 EPIDIDYMO-ORCHITIS: ICD-10-CM

## 2025-04-16 LAB
APPEARANCE: CLEAR
BACTERIA UR CULT: ABNORMAL
BACTERIA: NEGATIVE /HPF
BILIRUBIN URINE: NEGATIVE
BLOOD URINE: ABNORMAL
CAST: 0 /LPF
COLOR: YELLOW
EPITHELIAL CELLS: 0 /HPF
GLUCOSE QUALITATIVE U: >=1000 MG/DL
KETONES URINE: NEGATIVE MG/DL
LEUKOCYTE ESTERASE URINE: ABNORMAL
MICROSCOPIC-UA: NORMAL
NITRITE URINE: NEGATIVE
PH URINE: 6
PROTEIN URINE: 100 MG/DL
RED BLOOD CELLS URINE: 20 /HPF
SPECIFIC GRAVITY URINE: 1.03
UROBILINOGEN URINE: 0.2 MG/DL
WHITE BLOOD CELLS URINE: 119 /HPF

## 2025-04-16 RX ORDER — CEFUROXIME AXETIL 500 MG/1
500 TABLET, FILM COATED ORAL
Qty: 14 | Refills: 0 | Status: ACTIVE | COMMUNITY
Start: 2025-04-16 | End: 1900-01-01

## 2025-04-18 PROBLEM — N40.0 BPH (BENIGN PROSTATIC HYPERPLASIA): Status: ACTIVE | Noted: 2025-04-11

## 2025-04-18 PROBLEM — N45.2 ORCHITIS, LEFT: Status: ACTIVE | Noted: 2025-04-11

## 2025-05-13 ENCOUNTER — APPOINTMENT (OUTPATIENT)
Dept: INFECTIOUS DISEASE | Facility: CLINIC | Age: 75
End: 2025-05-13

## 2025-06-16 ENCOUNTER — NON-APPOINTMENT (OUTPATIENT)
Age: 75
End: 2025-06-16

## 2025-07-18 ENCOUNTER — TRANSCRIPTION ENCOUNTER (OUTPATIENT)
Age: 75
End: 2025-07-18

## 2025-07-18 ENCOUNTER — INPATIENT (INPATIENT)
Facility: HOSPITAL | Age: 75
LOS: 11 days | Discharge: HOME HEALTH | End: 2025-07-30
Attending: STUDENT IN AN ORGANIZED HEALTH CARE EDUCATION/TRAINING PROGRAM | Admitting: STUDENT IN AN ORGANIZED HEALTH CARE EDUCATION/TRAINING PROGRAM
Payer: MEDICARE

## 2025-07-18 VITALS
HEART RATE: 103 BPM | OXYGEN SATURATION: 96 % | RESPIRATION RATE: 17 BRPM | WEIGHT: 149.91 LBS | HEIGHT: 66 IN | SYSTOLIC BLOOD PRESSURE: 106 MMHG | DIASTOLIC BLOOD PRESSURE: 67 MMHG

## 2025-07-18 LAB
ALBUMIN SERPL ELPH-MCNC: 2.4 G/DL — LOW (ref 3.3–5)
ALP SERPL-CCNC: 78 U/L — SIGNIFICANT CHANGE UP (ref 40–120)
ALT FLD-CCNC: 26 U/L — SIGNIFICANT CHANGE UP (ref 12–78)
ANION GAP SERPL CALC-SCNC: 10 MMOL/L — SIGNIFICANT CHANGE UP (ref 5–17)
ANISOCYTOSIS BLD QL: SIGNIFICANT CHANGE UP
APPEARANCE UR: ABNORMAL
APTT BLD: 34.8 SEC — SIGNIFICANT CHANGE UP (ref 26.1–36.8)
AST SERPL-CCNC: 30 U/L — SIGNIFICANT CHANGE UP (ref 15–37)
BACTERIA # UR AUTO: ABNORMAL /HPF
BASE EXCESS BLDV CALC-SCNC: 1.2 MMOL/L — SIGNIFICANT CHANGE UP (ref -2–3)
BASOPHILS # BLD AUTO: 0 K/UL — SIGNIFICANT CHANGE UP (ref 0–0.2)
BASOPHILS NFR BLD AUTO: 0 % — SIGNIFICANT CHANGE UP (ref 0–2)
BILIRUB SERPL-MCNC: 1 MG/DL — SIGNIFICANT CHANGE UP (ref 0.2–1.2)
BILIRUB UR-MCNC: NEGATIVE — SIGNIFICANT CHANGE UP
BLOOD GAS COMMENTS, VENOUS: SIGNIFICANT CHANGE UP
BUN SERPL-MCNC: 38 MG/DL — HIGH (ref 7–23)
CALCIUM SERPL-MCNC: 9.5 MG/DL — SIGNIFICANT CHANGE UP (ref 8.5–10.1)
CHLORIDE BLDV-SCNC: 97 MMOL/L — LOW (ref 98–107)
CHLORIDE SERPL-SCNC: 96 MMOL/L — SIGNIFICANT CHANGE UP (ref 96–108)
CO2 BLDV-SCNC: 26 MMOL/L — SIGNIFICANT CHANGE UP (ref 22–26)
CO2 SERPL-SCNC: 23 MMOL/L — SIGNIFICANT CHANGE UP (ref 22–31)
COLOR SPEC: YELLOW — SIGNIFICANT CHANGE UP
CREAT SERPL-MCNC: 2.81 MG/DL — HIGH (ref 0.5–1.3)
D DIMER BLD IA.RAPID-MCNC: 1929 NG/ML DDU — HIGH
DIFF PNL FLD: ABNORMAL
EGFR: 23 ML/MIN/1.73M2 — LOW
EGFR: 23 ML/MIN/1.73M2 — LOW
EOSINOPHIL # BLD AUTO: 0 K/UL — SIGNIFICANT CHANGE UP (ref 0–0.5)
EOSINOPHIL NFR BLD AUTO: 0 % — SIGNIFICANT CHANGE UP (ref 0–6)
FLUAV AG NPH QL: SIGNIFICANT CHANGE UP
FLUBV AG NPH QL: SIGNIFICANT CHANGE UP
GAS PNL BLDV: 123 MMOL/L — LOW (ref 136–145)
GAS PNL BLDV: SIGNIFICANT CHANGE UP
GAS PNL BLDV: SIGNIFICANT CHANGE UP
GLUCOSE BLDV-MCNC: 210 MG/DL — HIGH (ref 65–95)
GLUCOSE SERPL-MCNC: 197 MG/DL — HIGH (ref 70–99)
GLUCOSE UR QL: >=1000 MG/DL
HCO3 BLDV-SCNC: 25 MMOL/L — SIGNIFICANT CHANGE UP (ref 22–28)
HCT VFR BLD CALC: 43 % — SIGNIFICANT CHANGE UP (ref 39–50)
HCT VFR BLDA CALC: 46 % — SIGNIFICANT CHANGE UP (ref 37–47)
HGB BLD CALC-MCNC: 15.3 G/DL — SIGNIFICANT CHANGE UP (ref 12.6–17.4)
HGB BLD-MCNC: 13.8 G/DL — SIGNIFICANT CHANGE UP (ref 13–17)
HOROWITZ INDEX BLDV+IHG-RTO: 21 — SIGNIFICANT CHANGE UP
INR BLD: 1.23 RATIO — HIGH (ref 0.85–1.16)
KETONES UR QL: NEGATIVE MG/DL — SIGNIFICANT CHANGE UP
LACTATE BLDV-MCNC: 2.4 MMOL/L — HIGH (ref 0.56–1.39)
LACTATE SERPL-SCNC: 2.4 MMOL/L — HIGH (ref 0.7–2)
LEUKOCYTE ESTERASE UR-ACNC: ABNORMAL
LG PLATELETS BLD QL AUTO: SIGNIFICANT CHANGE UP
LYMPHOCYTES # BLD AUTO: 0.77 K/UL — LOW (ref 1–3.3)
LYMPHOCYTES # BLD AUTO: 4 % — LOW (ref 13–44)
MACROCYTES BLD QL: SIGNIFICANT CHANGE UP
MANUAL SMEAR VERIFICATION: SIGNIFICANT CHANGE UP
MCHC RBC-ENTMCNC: 27.2 PG — SIGNIFICANT CHANGE UP (ref 27–34)
MCHC RBC-ENTMCNC: 32.1 G/DL — SIGNIFICANT CHANGE UP (ref 32–36)
MCV RBC AUTO: 84.6 FL — SIGNIFICANT CHANGE UP (ref 80–100)
MONOCYTES # BLD AUTO: 1.35 K/UL — HIGH (ref 0–0.9)
MONOCYTES NFR BLD AUTO: 7 % — SIGNIFICANT CHANGE UP (ref 2–14)
NEUTROPHILS # BLD AUTO: 16.6 K/UL — HIGH (ref 1.8–7.4)
NEUTROPHILS NFR BLD AUTO: 86 % — HIGH (ref 43–77)
NITRITE UR-MCNC: NEGATIVE — SIGNIFICANT CHANGE UP
NRBC # BLD: 0 /100 WBCS — SIGNIFICANT CHANGE UP (ref 0–0)
NRBC BLD AUTO-RTO: SIGNIFICANT CHANGE UP /100 WBCS (ref 0–0)
NRBC BLD-RTO: 0 /100 WBCS — SIGNIFICANT CHANGE UP (ref 0–0)
PCO2 BLDV: 37 MMHG — LOW (ref 42–55)
PH BLDV: 7.44 — HIGH (ref 7.32–7.43)
PH UR: 6 — SIGNIFICANT CHANGE UP (ref 5–8)
PLAT MORPH BLD: NORMAL — SIGNIFICANT CHANGE UP
PLATELET # BLD AUTO: 191 K/UL — SIGNIFICANT CHANGE UP (ref 150–400)
PLATELET COUNT - ESTIMATE: NORMAL — SIGNIFICANT CHANGE UP
PO2 BLDV: 36 MMHG — SIGNIFICANT CHANGE UP (ref 25–45)
POTASSIUM BLDV-SCNC: 4.3 MMOL/L — SIGNIFICANT CHANGE UP (ref 3.5–5.1)
POTASSIUM SERPL-MCNC: 4.7 MMOL/L — SIGNIFICANT CHANGE UP (ref 3.5–5.3)
POTASSIUM SERPL-SCNC: 4.7 MMOL/L — SIGNIFICANT CHANGE UP (ref 3.5–5.3)
PROT SERPL-MCNC: 7.7 GM/DL — SIGNIFICANT CHANGE UP (ref 6–8.3)
PROT UR-MCNC: 100 MG/DL
PROTHROM AB SERPL-ACNC: 13.9 SEC — HIGH (ref 9.9–13.4)
RBC # BLD: 5.08 M/UL — SIGNIFICANT CHANGE UP (ref 4.2–5.8)
RBC # FLD: 15.7 % — HIGH (ref 10.3–14.5)
RBC BLD AUTO: SIGNIFICANT CHANGE UP
RBC CASTS # UR COMP ASSIST: >100 /HPF — HIGH (ref 0–4)
RSV RNA NPH QL NAA+NON-PROBE: SIGNIFICANT CHANGE UP
SAO2 % BLDV: 66.4 % — LOW (ref 94–98)
SARS-COV-2 RNA SPEC QL NAA+PROBE: SIGNIFICANT CHANGE UP
SMUDGE CELLS # BLD: PRESENT — SIGNIFICANT CHANGE UP
SODIUM SERPL-SCNC: 129 MMOL/L — LOW (ref 135–145)
SOURCE RESPIRATORY: SIGNIFICANT CHANGE UP
SP GR SPEC: 1.02 — SIGNIFICANT CHANGE UP (ref 1–1.03)
TROPONIN I, HIGH SENSITIVITY RESULT: 18.9 NG/L — SIGNIFICANT CHANGE UP
UROBILINOGEN FLD QL: 0.2 MG/DL — SIGNIFICANT CHANGE UP (ref 0.2–1)
VARIANT LYMPHS # BLD: 3 % — SIGNIFICANT CHANGE UP (ref 0–6)
VARIANT LYMPHS NFR BLD MANUAL: 3 % — SIGNIFICANT CHANGE UP (ref 0–6)
WBC # BLD: 19.3 K/UL — HIGH (ref 3.8–10.5)
WBC # FLD AUTO: 19.3 K/UL — HIGH (ref 3.8–10.5)
WBC UR QL: >50 /HPF — HIGH (ref 0–5)

## 2025-07-18 PROCEDURE — 71045 X-RAY EXAM CHEST 1 VIEW: CPT | Mod: 26

## 2025-07-18 PROCEDURE — 99285 EMERGENCY DEPT VISIT HI MDM: CPT

## 2025-07-18 RX ORDER — PIPERACILLIN-TAZO-DEXTROSE,ISO 3.375G/5
3.38 IV SOLUTION, PIGGYBACK PREMIX FROZEN(ML) INTRAVENOUS ONCE
Refills: 0 | Status: COMPLETED | OUTPATIENT
Start: 2025-07-18 | End: 2025-07-18

## 2025-07-18 RX ORDER — VANCOMYCIN HCL IN 5 % DEXTROSE 1.5G/250ML
1000 PLASTIC BAG, INJECTION (ML) INTRAVENOUS ONCE
Refills: 0 | Status: COMPLETED | OUTPATIENT
Start: 2025-07-18 | End: 2025-07-18

## 2025-07-18 RX ORDER — ACETAMINOPHEN 500 MG/5ML
1000 LIQUID (ML) ORAL ONCE
Refills: 0 | Status: COMPLETED | OUTPATIENT
Start: 2025-07-18 | End: 2025-07-18

## 2025-07-18 RX ORDER — SODIUM CHLORIDE 9 G/1000ML
2100 INJECTION, SOLUTION INTRAVENOUS ONCE
Refills: 0 | Status: COMPLETED | OUTPATIENT
Start: 2025-07-18 | End: 2025-07-18

## 2025-07-18 RX ADMIN — Medication 1000 MILLIGRAM(S): at 23:29

## 2025-07-18 RX ADMIN — SODIUM CHLORIDE 2100 MILLILITER(S): 9 INJECTION, SOLUTION INTRAVENOUS at 20:45

## 2025-07-18 RX ADMIN — Medication 200 GRAM(S): at 23:16

## 2025-07-18 RX ADMIN — Medication 400 MILLIGRAM(S): at 21:38

## 2025-07-18 RX ADMIN — Medication 250 MILLIGRAM(S): at 21:47

## 2025-07-19 DIAGNOSIS — A41.9 SEPSIS, UNSPECIFIED ORGANISM: ICD-10-CM

## 2025-07-19 DIAGNOSIS — I10 ESSENTIAL (PRIMARY) HYPERTENSION: ICD-10-CM

## 2025-07-19 DIAGNOSIS — E11.9 TYPE 2 DIABETES MELLITUS WITHOUT COMPLICATIONS: ICD-10-CM

## 2025-07-19 DIAGNOSIS — N17.9 ACUTE KIDNEY FAILURE, UNSPECIFIED: ICD-10-CM

## 2025-07-19 DIAGNOSIS — Z29.9 ENCOUNTER FOR PROPHYLACTIC MEASURES, UNSPECIFIED: ICD-10-CM

## 2025-07-19 DIAGNOSIS — R79.89 OTHER SPECIFIED ABNORMAL FINDINGS OF BLOOD CHEMISTRY: ICD-10-CM

## 2025-07-19 DIAGNOSIS — E03.9 HYPOTHYROIDISM, UNSPECIFIED: ICD-10-CM

## 2025-07-19 DIAGNOSIS — N12 TUBULO-INTERSTITIAL NEPHRITIS, NOT SPECIFIED AS ACUTE OR CHRONIC: ICD-10-CM

## 2025-07-19 LAB
ALBUMIN SERPL ELPH-MCNC: 2.3 G/DL — LOW (ref 3.3–5)
ALP SERPL-CCNC: 81 U/L — SIGNIFICANT CHANGE UP (ref 40–120)
ALT FLD-CCNC: 21 U/L — SIGNIFICANT CHANGE UP (ref 12–78)
ANION GAP SERPL CALC-SCNC: 10 MMOL/L — SIGNIFICANT CHANGE UP (ref 5–17)
AST SERPL-CCNC: 17 U/L — SIGNIFICANT CHANGE UP (ref 15–37)
BASOPHILS # BLD AUTO: 0.04 K/UL — SIGNIFICANT CHANGE UP (ref 0–0.2)
BASOPHILS NFR BLD AUTO: 0.2 % — SIGNIFICANT CHANGE UP (ref 0–2)
BILIRUB SERPL-MCNC: 1.1 MG/DL — SIGNIFICANT CHANGE UP (ref 0.2–1.2)
BUN SERPL-MCNC: 34 MG/DL — HIGH (ref 7–23)
CALCIUM SERPL-MCNC: 9.3 MG/DL — SIGNIFICANT CHANGE UP (ref 8.5–10.1)
CHLORIDE SERPL-SCNC: 100 MMOL/L — SIGNIFICANT CHANGE UP (ref 96–108)
CO2 SERPL-SCNC: 22 MMOL/L — SIGNIFICANT CHANGE UP (ref 22–31)
CREAT SERPL-MCNC: 2.63 MG/DL — HIGH (ref 0.5–1.3)
EGFR: 24 ML/MIN/1.73M2 — LOW
EGFR: 24 ML/MIN/1.73M2 — LOW
EOSINOPHIL # BLD AUTO: 0.01 K/UL — SIGNIFICANT CHANGE UP (ref 0–0.5)
EOSINOPHIL NFR BLD AUTO: 0.1 % — SIGNIFICANT CHANGE UP (ref 0–6)
GLUCOSE BLDC GLUCOMTR-MCNC: 181 MG/DL — HIGH (ref 70–99)
GLUCOSE BLDC GLUCOMTR-MCNC: 189 MG/DL — HIGH (ref 70–99)
GLUCOSE BLDC GLUCOMTR-MCNC: 265 MG/DL — HIGH (ref 70–99)
GLUCOSE SERPL-MCNC: 238 MG/DL — HIGH (ref 70–99)
HCT VFR BLD CALC: 42.7 % — SIGNIFICANT CHANGE UP (ref 39–50)
HGB BLD-MCNC: 14 G/DL — SIGNIFICANT CHANGE UP (ref 13–17)
IMM GRANULOCYTES NFR BLD AUTO: 1.1 % — HIGH (ref 0–0.9)
LYMPHOCYTES # BLD AUTO: 1.23 K/UL — SIGNIFICANT CHANGE UP (ref 1–3.3)
LYMPHOCYTES # BLD AUTO: 7.4 % — LOW (ref 13–44)
MAGNESIUM SERPL-MCNC: 2.5 MG/DL — SIGNIFICANT CHANGE UP (ref 1.6–2.6)
MCHC RBC-ENTMCNC: 27.6 PG — SIGNIFICANT CHANGE UP (ref 27–34)
MCHC RBC-ENTMCNC: 32.8 G/DL — SIGNIFICANT CHANGE UP (ref 32–36)
MCV RBC AUTO: 84.2 FL — SIGNIFICANT CHANGE UP (ref 80–100)
MONOCYTES # BLD AUTO: 1.58 K/UL — HIGH (ref 0–0.9)
MONOCYTES NFR BLD AUTO: 9.5 % — SIGNIFICANT CHANGE UP (ref 2–14)
NEUTROPHILS # BLD AUTO: 13.53 K/UL — HIGH (ref 1.8–7.4)
NEUTROPHILS NFR BLD AUTO: 81.7 % — HIGH (ref 43–77)
NRBC BLD AUTO-RTO: 0 /100 WBCS — SIGNIFICANT CHANGE UP (ref 0–0)
PHOSPHATE SERPL-MCNC: 1.8 MG/DL — LOW (ref 2.5–4.5)
PLATELET # BLD AUTO: 197 K/UL — SIGNIFICANT CHANGE UP (ref 150–400)
POTASSIUM SERPL-MCNC: 3.7 MMOL/L — SIGNIFICANT CHANGE UP (ref 3.5–5.3)
POTASSIUM SERPL-SCNC: 3.7 MMOL/L — SIGNIFICANT CHANGE UP (ref 3.5–5.3)
PROT SERPL-MCNC: 7.3 GM/DL — SIGNIFICANT CHANGE UP (ref 6–8.3)
RBC # BLD: 5.07 M/UL — SIGNIFICANT CHANGE UP (ref 4.2–5.8)
RBC # FLD: 15.6 % — HIGH (ref 10.3–14.5)
SODIUM SERPL-SCNC: 132 MMOL/L — LOW (ref 135–145)
WBC # BLD: 16.57 K/UL — HIGH (ref 3.8–10.5)
WBC # FLD AUTO: 16.57 K/UL — HIGH (ref 3.8–10.5)

## 2025-07-19 PROCEDURE — 93010 ELECTROCARDIOGRAM REPORT: CPT | Mod: 76

## 2025-07-19 PROCEDURE — 70450 CT HEAD/BRAIN W/O DYE: CPT | Mod: 26

## 2025-07-19 PROCEDURE — 99222 1ST HOSP IP/OBS MODERATE 55: CPT

## 2025-07-19 PROCEDURE — 74176 CT ABD & PELVIS W/O CONTRAST: CPT | Mod: 26

## 2025-07-19 RX ORDER — SODIUM CHLORIDE 9 G/1000ML
1000 INJECTION, SOLUTION INTRAVENOUS
Refills: 0 | Status: DISCONTINUED | OUTPATIENT
Start: 2025-07-19 | End: 2025-07-30

## 2025-07-19 RX ORDER — POLYETHYLENE GLYCOL 3350 17 G/17G
17 POWDER, FOR SOLUTION ORAL
Refills: 0 | Status: DISCONTINUED | OUTPATIENT
Start: 2025-07-19 | End: 2025-07-30

## 2025-07-19 RX ORDER — PIPERACILLIN-TAZO-DEXTROSE,ISO 3.375G/5
3.38 IV SOLUTION, PIGGYBACK PREMIX FROZEN(ML) INTRAVENOUS EVERY 8 HOURS
Refills: 0 | Status: DISCONTINUED | OUTPATIENT
Start: 2025-07-19 | End: 2025-07-20

## 2025-07-19 RX ORDER — ASPIRIN 325 MG
0 TABLET ORAL
Qty: 0 | Refills: 3 | DISCHARGE

## 2025-07-19 RX ORDER — ATORVASTATIN CALCIUM 80 MG/1
10 TABLET, FILM COATED ORAL AT BEDTIME
Refills: 0 | Status: DISCONTINUED | OUTPATIENT
Start: 2025-07-19 | End: 2025-07-22

## 2025-07-19 RX ORDER — DEXTROSE 50 % IN WATER 50 %
12.5 SYRINGE (ML) INTRAVENOUS ONCE
Refills: 0 | Status: DISCONTINUED | OUTPATIENT
Start: 2025-07-19 | End: 2025-07-30

## 2025-07-19 RX ORDER — ACETAMINOPHEN 500 MG/5ML
1000 LIQUID (ML) ORAL ONCE
Refills: 0 | Status: COMPLETED | OUTPATIENT
Start: 2025-07-19 | End: 2025-07-19

## 2025-07-19 RX ORDER — MELATONIN 5 MG
3 TABLET ORAL AT BEDTIME
Refills: 0 | Status: DISCONTINUED | OUTPATIENT
Start: 2025-07-19 | End: 2025-07-30

## 2025-07-19 RX ORDER — ASPIRIN 325 MG
81 TABLET ORAL DAILY
Refills: 0 | Status: DISCONTINUED | OUTPATIENT
Start: 2025-07-19 | End: 2025-07-30

## 2025-07-19 RX ORDER — MAGNESIUM, ALUMINUM HYDROXIDE 200-200 MG
30 TABLET,CHEWABLE ORAL EVERY 4 HOURS
Refills: 0 | Status: DISCONTINUED | OUTPATIENT
Start: 2025-07-19 | End: 2025-07-30

## 2025-07-19 RX ORDER — DEXTROSE 50 % IN WATER 50 %
15 SYRINGE (ML) INTRAVENOUS ONCE
Refills: 0 | Status: DISCONTINUED | OUTPATIENT
Start: 2025-07-19 | End: 2025-07-30

## 2025-07-19 RX ORDER — ACETAMINOPHEN 500 MG/5ML
650 LIQUID (ML) ORAL EVERY 6 HOURS
Refills: 0 | Status: DISCONTINUED | OUTPATIENT
Start: 2025-07-19 | End: 2025-07-30

## 2025-07-19 RX ORDER — DEXTROSE 50 % IN WATER 50 %
25 SYRINGE (ML) INTRAVENOUS ONCE
Refills: 0 | Status: DISCONTINUED | OUTPATIENT
Start: 2025-07-19 | End: 2025-07-30

## 2025-07-19 RX ORDER — ONDANSETRON HCL/PF 4 MG/2 ML
4 VIAL (ML) INJECTION EVERY 8 HOURS
Refills: 0 | Status: DISCONTINUED | OUTPATIENT
Start: 2025-07-19 | End: 2025-07-30

## 2025-07-19 RX ORDER — LEVOTHYROXINE SODIUM 300 MCG
100 TABLET ORAL DAILY
Refills: 0 | Status: DISCONTINUED | OUTPATIENT
Start: 2025-07-19 | End: 2025-07-30

## 2025-07-19 RX ORDER — ATORVASTATIN CALCIUM 80 MG/1
0 TABLET, FILM COATED ORAL
Qty: 0 | Refills: 0 | DISCHARGE

## 2025-07-19 RX ORDER — INSULIN LISPRO 100 U/ML
INJECTION, SOLUTION INTRAVENOUS; SUBCUTANEOUS
Refills: 0 | Status: DISCONTINUED | OUTPATIENT
Start: 2025-07-19 | End: 2025-07-30

## 2025-07-19 RX ORDER — SODIUM CHLORIDE 9 G/1000ML
1000 INJECTION, SOLUTION INTRAVENOUS
Refills: 0 | Status: DISCONTINUED | OUTPATIENT
Start: 2025-07-19 | End: 2025-07-22

## 2025-07-19 RX ORDER — SENNA 187 MG
2 TABLET ORAL AT BEDTIME
Refills: 0 | Status: DISCONTINUED | OUTPATIENT
Start: 2025-07-19 | End: 2025-07-30

## 2025-07-19 RX ORDER — GLUCAGON 3 MG/1
1 POWDER NASAL ONCE
Refills: 0 | Status: DISCONTINUED | OUTPATIENT
Start: 2025-07-19 | End: 2025-07-30

## 2025-07-19 RX ADMIN — Medication 2 TABLET(S): at 21:01

## 2025-07-19 RX ADMIN — ATORVASTATIN CALCIUM 10 MILLIGRAM(S): 80 TABLET, FILM COATED ORAL at 21:02

## 2025-07-19 RX ADMIN — INSULIN LISPRO 3: 100 INJECTION, SOLUTION INTRAVENOUS; SUBCUTANEOUS at 18:37

## 2025-07-19 RX ADMIN — Medication 25 GRAM(S): at 23:22

## 2025-07-19 RX ADMIN — Medication 25 GRAM(S): at 08:44

## 2025-07-19 RX ADMIN — SODIUM CHLORIDE 100 MILLILITER(S): 9 INJECTION, SOLUTION INTRAVENOUS at 21:01

## 2025-07-19 RX ADMIN — Medication 25 GRAM(S): at 15:11

## 2025-07-19 RX ADMIN — Medication 100 MICROGRAM(S): at 09:42

## 2025-07-19 RX ADMIN — Medication 400 MILLIGRAM(S): at 06:57

## 2025-07-19 RX ADMIN — SODIUM CHLORIDE 100 MILLILITER(S): 9 INJECTION, SOLUTION INTRAVENOUS at 09:42

## 2025-07-19 RX ADMIN — SODIUM CHLORIDE 100 MILLILITER(S): 9 INJECTION, SOLUTION INTRAVENOUS at 12:19

## 2025-07-19 RX ADMIN — POLYETHYLENE GLYCOL 3350 17 GRAM(S): 17 POWDER, FOR SOLUTION ORAL at 18:37

## 2025-07-19 RX ADMIN — Medication 81 MILLIGRAM(S): at 12:18

## 2025-07-19 RX ADMIN — SODIUM CHLORIDE 100 MILLILITER(S): 9 INJECTION, SOLUTION INTRAVENOUS at 07:30

## 2025-07-19 RX ADMIN — INSULIN LISPRO 1: 100 INJECTION, SOLUTION INTRAVENOUS; SUBCUTANEOUS at 12:18

## 2025-07-20 LAB
-  CTX-M RESISTANCE MARKER: SIGNIFICANT CHANGE UP
-  ESBL: SIGNIFICANT CHANGE UP
A1C WITH ESTIMATED AVERAGE GLUCOSE RESULT: 7.2 % — HIGH (ref 4–5.6)
ANION GAP SERPL CALC-SCNC: 13 MMOL/L — SIGNIFICANT CHANGE UP (ref 5–17)
ANION GAP SERPL CALC-SCNC: 9 MMOL/L — SIGNIFICANT CHANGE UP (ref 5–17)
BUN SERPL-MCNC: 30 MG/DL — HIGH (ref 7–23)
BUN SERPL-MCNC: 32 MG/DL — HIGH (ref 7–23)
CALCIUM SERPL-MCNC: 9.6 MG/DL — SIGNIFICANT CHANGE UP (ref 8.5–10.1)
CALCIUM SERPL-MCNC: 9.6 MG/DL — SIGNIFICANT CHANGE UP (ref 8.5–10.1)
CHLORIDE SERPL-SCNC: 98 MMOL/L — SIGNIFICANT CHANGE UP (ref 96–108)
CHLORIDE SERPL-SCNC: 99 MMOL/L — SIGNIFICANT CHANGE UP (ref 96–108)
CO2 SERPL-SCNC: 20 MMOL/L — LOW (ref 22–31)
CO2 SERPL-SCNC: 23 MMOL/L — SIGNIFICANT CHANGE UP (ref 22–31)
CREAT SERPL-MCNC: 2.51 MG/DL — HIGH (ref 0.5–1.3)
CREAT SERPL-MCNC: 2.57 MG/DL — HIGH (ref 0.5–1.3)
E COLI DNA BLD POS QL NAA+NON-PROBE: SIGNIFICANT CHANGE UP
EGFR: 25 ML/MIN/1.73M2 — LOW
EGFR: 25 ML/MIN/1.73M2 — LOW
EGFR: 26 ML/MIN/1.73M2 — LOW
EGFR: 26 ML/MIN/1.73M2 — LOW
ESTIMATED AVERAGE GLUCOSE: 160 MG/DL — HIGH (ref 68–114)
GLUCOSE BLDC GLUCOMTR-MCNC: 187 MG/DL — HIGH (ref 70–99)
GLUCOSE BLDC GLUCOMTR-MCNC: 198 MG/DL — HIGH (ref 70–99)
GLUCOSE BLDC GLUCOMTR-MCNC: 202 MG/DL — HIGH (ref 70–99)
GLUCOSE BLDC GLUCOMTR-MCNC: 218 MG/DL — HIGH (ref 70–99)
GLUCOSE BLDC GLUCOMTR-MCNC: 289 MG/DL — HIGH (ref 70–99)
GLUCOSE SERPL-MCNC: 213 MG/DL — HIGH (ref 70–99)
GLUCOSE SERPL-MCNC: 221 MG/DL — HIGH (ref 70–99)
GRAM STN FLD: ABNORMAL
HCT VFR BLD CALC: 40.6 % — SIGNIFICANT CHANGE UP (ref 39–50)
HCT VFR BLD CALC: 43 % — SIGNIFICANT CHANGE UP (ref 39–50)
HGB BLD-MCNC: 13.5 G/DL — SIGNIFICANT CHANGE UP (ref 13–17)
HGB BLD-MCNC: 14 G/DL — SIGNIFICANT CHANGE UP (ref 13–17)
MAGNESIUM SERPL-MCNC: 2.5 MG/DL — SIGNIFICANT CHANGE UP (ref 1.6–2.6)
MAGNESIUM SERPL-MCNC: 2.5 MG/DL — SIGNIFICANT CHANGE UP (ref 1.6–2.6)
MCHC RBC-ENTMCNC: 26.9 PG — LOW (ref 27–34)
MCHC RBC-ENTMCNC: 27.5 PG — SIGNIFICANT CHANGE UP (ref 27–34)
MCHC RBC-ENTMCNC: 32.6 G/DL — SIGNIFICANT CHANGE UP (ref 32–36)
MCHC RBC-ENTMCNC: 33.3 G/DL — SIGNIFICANT CHANGE UP (ref 32–36)
MCV RBC AUTO: 82.7 FL — SIGNIFICANT CHANGE UP (ref 80–100)
MCV RBC AUTO: 82.7 FL — SIGNIFICANT CHANGE UP (ref 80–100)
METHOD TYPE: SIGNIFICANT CHANGE UP
NRBC BLD AUTO-RTO: 0 /100 WBCS — SIGNIFICANT CHANGE UP (ref 0–0)
NRBC BLD AUTO-RTO: 0 /100 WBCS — SIGNIFICANT CHANGE UP (ref 0–0)
PHOSPHATE SERPL-MCNC: 2.5 MG/DL — SIGNIFICANT CHANGE UP (ref 2.5–4.5)
PHOSPHATE SERPL-MCNC: 2.6 MG/DL — SIGNIFICANT CHANGE UP (ref 2.5–4.5)
PLATELET # BLD AUTO: 192 K/UL — SIGNIFICANT CHANGE UP (ref 150–400)
PLATELET # BLD AUTO: 204 K/UL — SIGNIFICANT CHANGE UP (ref 150–400)
POTASSIUM SERPL-MCNC: 3.7 MMOL/L — SIGNIFICANT CHANGE UP (ref 3.5–5.3)
POTASSIUM SERPL-MCNC: 3.8 MMOL/L — SIGNIFICANT CHANGE UP (ref 3.5–5.3)
POTASSIUM SERPL-SCNC: 3.7 MMOL/L — SIGNIFICANT CHANGE UP (ref 3.5–5.3)
POTASSIUM SERPL-SCNC: 3.8 MMOL/L — SIGNIFICANT CHANGE UP (ref 3.5–5.3)
RBC # BLD: 4.91 M/UL — SIGNIFICANT CHANGE UP (ref 4.2–5.8)
RBC # BLD: 5.2 M/UL — SIGNIFICANT CHANGE UP (ref 4.2–5.8)
RBC # FLD: 15.4 % — HIGH (ref 10.3–14.5)
RBC # FLD: 15.5 % — HIGH (ref 10.3–14.5)
SODIUM SERPL-SCNC: 131 MMOL/L — LOW (ref 135–145)
SODIUM SERPL-SCNC: 131 MMOL/L — LOW (ref 135–145)
SPECIMEN SOURCE: SIGNIFICANT CHANGE UP
WBC # BLD: 15.24 K/UL — HIGH (ref 3.8–10.5)
WBC # BLD: 15.77 K/UL — HIGH (ref 3.8–10.5)
WBC # FLD AUTO: 15.24 K/UL — HIGH (ref 3.8–10.5)
WBC # FLD AUTO: 15.77 K/UL — HIGH (ref 3.8–10.5)

## 2025-07-20 PROCEDURE — 93010 ELECTROCARDIOGRAM REPORT: CPT

## 2025-07-20 PROCEDURE — 51703 INSERT BLADDER CATH COMPLEX: CPT

## 2025-07-20 PROCEDURE — 99233 SBSQ HOSP IP/OBS HIGH 50: CPT

## 2025-07-20 PROCEDURE — 99222 1ST HOSP IP/OBS MODERATE 55: CPT | Mod: 25

## 2025-07-20 RX ORDER — TAMSULOSIN HYDROCHLORIDE 0.4 MG/1
0.4 CAPSULE ORAL AT BEDTIME
Refills: 0 | Status: DISCONTINUED | OUTPATIENT
Start: 2025-07-20 | End: 2025-07-30

## 2025-07-20 RX ORDER — METOPROLOL SUCCINATE 50 MG/1
2.5 TABLET, EXTENDED RELEASE ORAL EVERY 6 HOURS
Refills: 0 | Status: DISCONTINUED | OUTPATIENT
Start: 2025-07-20 | End: 2025-07-22

## 2025-07-20 RX ORDER — FINASTERIDE 1 MG/1
5 TABLET, FILM COATED ORAL DAILY
Refills: 0 | Status: DISCONTINUED | OUTPATIENT
Start: 2025-07-20 | End: 2025-07-30

## 2025-07-20 RX ORDER — BISACODYL 5 MG
10 TABLET, DELAYED RELEASE (ENTERIC COATED) ORAL ONCE
Refills: 0 | Status: COMPLETED | OUTPATIENT
Start: 2025-07-20 | End: 2025-07-20

## 2025-07-20 RX ORDER — SIMETHICONE 80 MG
80 TABLET,CHEWABLE ORAL THREE TIMES A DAY
Refills: 0 | Status: DISCONTINUED | OUTPATIENT
Start: 2025-07-20 | End: 2025-07-30

## 2025-07-20 RX ORDER — MEROPENEM 1 G/30ML
INJECTION INTRAVENOUS
Refills: 0 | Status: COMPLETED | OUTPATIENT
Start: 2025-07-20 | End: 2025-07-29

## 2025-07-20 RX ORDER — SODIUM CHLORIDE 9 G/1000ML
500 INJECTION, SOLUTION INTRAVENOUS ONCE
Refills: 0 | Status: COMPLETED | OUTPATIENT
Start: 2025-07-20 | End: 2025-07-20

## 2025-07-20 RX ORDER — MEROPENEM 1 G/30ML
1000 INJECTION INTRAVENOUS ONCE
Refills: 0 | Status: COMPLETED | OUTPATIENT
Start: 2025-07-20 | End: 2025-07-20

## 2025-07-20 RX ORDER — MEROPENEM 1 G/30ML
1000 INJECTION INTRAVENOUS EVERY 12 HOURS
Refills: 0 | Status: COMPLETED | OUTPATIENT
Start: 2025-07-21 | End: 2025-07-28

## 2025-07-20 RX ADMIN — POLYETHYLENE GLYCOL 3350 17 GRAM(S): 17 POWDER, FOR SOLUTION ORAL at 18:16

## 2025-07-20 RX ADMIN — SODIUM CHLORIDE 500 MILLILITER(S): 9 INJECTION, SOLUTION INTRAVENOUS at 02:30

## 2025-07-20 RX ADMIN — TAMSULOSIN HYDROCHLORIDE 0.4 MILLIGRAM(S): 0.4 CAPSULE ORAL at 21:55

## 2025-07-20 RX ADMIN — Medication 100 MICROGRAM(S): at 05:42

## 2025-07-20 RX ADMIN — Medication 25 GRAM(S): at 06:56

## 2025-07-20 RX ADMIN — INSULIN LISPRO 1: 100 INJECTION, SOLUTION INTRAVENOUS; SUBCUTANEOUS at 17:27

## 2025-07-20 RX ADMIN — Medication 2 TABLET(S): at 21:52

## 2025-07-20 RX ADMIN — Medication 10 MILLIGRAM(S): at 00:51

## 2025-07-20 RX ADMIN — Medication 650 MILLIGRAM(S): at 09:50

## 2025-07-20 RX ADMIN — METOPROLOL SUCCINATE 2.5 MILLIGRAM(S): 50 TABLET, EXTENDED RELEASE ORAL at 16:46

## 2025-07-20 RX ADMIN — Medication 30 MILLILITER(S): at 09:51

## 2025-07-20 RX ADMIN — FINASTERIDE 5 MILLIGRAM(S): 1 TABLET, FILM COATED ORAL at 21:57

## 2025-07-20 RX ADMIN — SODIUM CHLORIDE 100 MILLILITER(S): 9 INJECTION, SOLUTION INTRAVENOUS at 11:27

## 2025-07-20 RX ADMIN — INSULIN LISPRO 2: 100 INJECTION, SOLUTION INTRAVENOUS; SUBCUTANEOUS at 08:31

## 2025-07-20 RX ADMIN — Medication 81 MILLIGRAM(S): at 12:00

## 2025-07-20 RX ADMIN — Medication 650 MILLIGRAM(S): at 10:11

## 2025-07-20 RX ADMIN — Medication 650 MILLIGRAM(S): at 23:55

## 2025-07-20 RX ADMIN — MEROPENEM 100 MILLIGRAM(S): 1 INJECTION INTRAVENOUS at 16:02

## 2025-07-20 RX ADMIN — POLYETHYLENE GLYCOL 3350 17 GRAM(S): 17 POWDER, FOR SOLUTION ORAL at 05:42

## 2025-07-20 RX ADMIN — Medication 650 MILLIGRAM(S): at 23:49

## 2025-07-20 RX ADMIN — SODIUM CHLORIDE 100 MILLILITER(S): 9 INJECTION, SOLUTION INTRAVENOUS at 09:51

## 2025-07-20 RX ADMIN — INSULIN LISPRO 3: 100 INJECTION, SOLUTION INTRAVENOUS; SUBCUTANEOUS at 12:00

## 2025-07-20 RX ADMIN — ATORVASTATIN CALCIUM 10 MILLIGRAM(S): 80 TABLET, FILM COATED ORAL at 21:52

## 2025-07-20 RX ADMIN — METOPROLOL SUCCINATE 2.5 MILLIGRAM(S): 50 TABLET, EXTENDED RELEASE ORAL at 08:56

## 2025-07-20 RX ADMIN — Medication 80 MILLIGRAM(S): at 18:16

## 2025-07-21 LAB
-  AMPICILLIN/SULBACTAM: SIGNIFICANT CHANGE UP
-  AMPICILLIN: SIGNIFICANT CHANGE UP
-  AZTREONAM: SIGNIFICANT CHANGE UP
-  CEFAZOLIN: SIGNIFICANT CHANGE UP
-  CEFEPIME: SIGNIFICANT CHANGE UP
-  CEFTRIAXONE: SIGNIFICANT CHANGE UP
-  CIPROFLOXACIN: SIGNIFICANT CHANGE UP
-  ERTAPENEM: SIGNIFICANT CHANGE UP
-  GENTAMICIN: SIGNIFICANT CHANGE UP
-  IMIPENEM: SIGNIFICANT CHANGE UP
-  LEVOFLOXACIN: SIGNIFICANT CHANGE UP
-  MEROPENEM: SIGNIFICANT CHANGE UP
-  PIPERACILLIN/TAZOBACTAM: SIGNIFICANT CHANGE UP
-  TIGECYCLINE: SIGNIFICANT CHANGE UP
-  TOBRAMYCIN: SIGNIFICANT CHANGE UP
-  TRIMETHOPRIM/SULFAMETHOXAZOLE: SIGNIFICANT CHANGE UP
ALBUMIN SERPL ELPH-MCNC: 1.6 G/DL — LOW (ref 3.3–5)
ALP SERPL-CCNC: 110 U/L — SIGNIFICANT CHANGE UP (ref 40–120)
ALT FLD-CCNC: 41 U/L — SIGNIFICANT CHANGE UP (ref 12–78)
ANION GAP SERPL CALC-SCNC: 7 MMOL/L — SIGNIFICANT CHANGE UP (ref 5–17)
ANION GAP SERPL CALC-SCNC: 8 MMOL/L — SIGNIFICANT CHANGE UP (ref 5–17)
AST SERPL-CCNC: 47 U/L — HIGH (ref 15–37)
BASOPHILS # BLD AUTO: 0.06 K/UL — SIGNIFICANT CHANGE UP (ref 0–0.2)
BASOPHILS NFR BLD AUTO: 0.5 % — SIGNIFICANT CHANGE UP (ref 0–2)
BILIRUB SERPL-MCNC: 0.7 MG/DL — SIGNIFICANT CHANGE UP (ref 0.2–1.2)
BUN SERPL-MCNC: 34 MG/DL — HIGH (ref 7–23)
BUN SERPL-MCNC: 35 MG/DL — HIGH (ref 7–23)
CALCIUM SERPL-MCNC: 8.8 MG/DL — SIGNIFICANT CHANGE UP (ref 8.5–10.1)
CALCIUM SERPL-MCNC: 9.3 MG/DL — SIGNIFICANT CHANGE UP (ref 8.5–10.1)
CHLORIDE SERPL-SCNC: 101 MMOL/L — SIGNIFICANT CHANGE UP (ref 96–108)
CHLORIDE SERPL-SCNC: 102 MMOL/L — SIGNIFICANT CHANGE UP (ref 96–108)
CO2 SERPL-SCNC: 25 MMOL/L — SIGNIFICANT CHANGE UP (ref 22–31)
CO2 SERPL-SCNC: 26 MMOL/L — SIGNIFICANT CHANGE UP (ref 22–31)
CREAT SERPL-MCNC: 3.18 MG/DL — HIGH (ref 0.5–1.3)
CREAT SERPL-MCNC: 3.47 MG/DL — HIGH (ref 0.5–1.3)
CULTURE RESULTS: ABNORMAL
EGFR: 18 ML/MIN/1.73M2 — LOW
EGFR: 18 ML/MIN/1.73M2 — LOW
EGFR: 19 ML/MIN/1.73M2 — LOW
EGFR: 19 ML/MIN/1.73M2 — LOW
EOSINOPHIL # BLD AUTO: 0.25 K/UL — SIGNIFICANT CHANGE UP (ref 0–0.5)
EOSINOPHIL NFR BLD AUTO: 2.1 % — SIGNIFICANT CHANGE UP (ref 0–6)
GLUCOSE BLDC GLUCOMTR-MCNC: 142 MG/DL — HIGH (ref 70–99)
GLUCOSE BLDC GLUCOMTR-MCNC: 146 MG/DL — HIGH (ref 70–99)
GLUCOSE BLDC GLUCOMTR-MCNC: 169 MG/DL — HIGH (ref 70–99)
GLUCOSE BLDC GLUCOMTR-MCNC: 199 MG/DL — HIGH (ref 70–99)
GLUCOSE SERPL-MCNC: 182 MG/DL — HIGH (ref 70–99)
GLUCOSE SERPL-MCNC: 209 MG/DL — HIGH (ref 70–99)
GRAM STN FLD: ABNORMAL
HCT VFR BLD CALC: 36.5 % — LOW (ref 39–50)
HCT VFR BLD CALC: 36.9 % — LOW (ref 39–50)
HGB BLD-MCNC: 11.7 G/DL — LOW (ref 13–17)
HGB BLD-MCNC: 12.5 G/DL — LOW (ref 13–17)
IMM GRANULOCYTES NFR BLD AUTO: 0.6 % — SIGNIFICANT CHANGE UP (ref 0–0.9)
LACTATE SERPL-SCNC: 1.3 MMOL/L — SIGNIFICANT CHANGE UP (ref 0.7–2)
LYMPHOCYTES # BLD AUTO: 0.88 K/UL — LOW (ref 1–3.3)
LYMPHOCYTES # BLD AUTO: 7.3 % — LOW (ref 13–44)
MAGNESIUM SERPL-MCNC: 2.7 MG/DL — HIGH (ref 1.6–2.6)
MCHC RBC-ENTMCNC: 26.7 PG — LOW (ref 27–34)
MCHC RBC-ENTMCNC: 28 PG — SIGNIFICANT CHANGE UP (ref 27–34)
MCHC RBC-ENTMCNC: 32.1 G/DL — SIGNIFICANT CHANGE UP (ref 32–36)
MCHC RBC-ENTMCNC: 33.9 G/DL — SIGNIFICANT CHANGE UP (ref 32–36)
MCV RBC AUTO: 82.7 FL — SIGNIFICANT CHANGE UP (ref 80–100)
MCV RBC AUTO: 83.3 FL — SIGNIFICANT CHANGE UP (ref 80–100)
METHOD TYPE: SIGNIFICANT CHANGE UP
MONOCYTES # BLD AUTO: 2.05 K/UL — HIGH (ref 0–0.9)
MONOCYTES NFR BLD AUTO: 17.1 % — HIGH (ref 2–14)
NEUTROPHILS # BLD AUTO: 8.68 K/UL — HIGH (ref 1.8–7.4)
NEUTROPHILS NFR BLD AUTO: 72.4 % — SIGNIFICANT CHANGE UP (ref 43–77)
NRBC BLD AUTO-RTO: 0 /100 WBCS — SIGNIFICANT CHANGE UP (ref 0–0)
NRBC BLD AUTO-RTO: 0 /100 WBCS — SIGNIFICANT CHANGE UP (ref 0–0)
ORGANISM # SPEC MICROSCOPIC CNT: ABNORMAL
ORGANISM # SPEC MICROSCOPIC CNT: ABNORMAL
ORGANISM # SPEC MICROSCOPIC CNT: SIGNIFICANT CHANGE UP
PHOSPHATE SERPL-MCNC: 3.5 MG/DL — SIGNIFICANT CHANGE UP (ref 2.5–4.5)
PLATELET # BLD AUTO: 162 K/UL — SIGNIFICANT CHANGE UP (ref 150–400)
PLATELET # BLD AUTO: 166 K/UL — SIGNIFICANT CHANGE UP (ref 150–400)
POTASSIUM SERPL-MCNC: 3.7 MMOL/L — SIGNIFICANT CHANGE UP (ref 3.5–5.3)
POTASSIUM SERPL-MCNC: 3.8 MMOL/L — SIGNIFICANT CHANGE UP (ref 3.5–5.3)
POTASSIUM SERPL-SCNC: 3.7 MMOL/L — SIGNIFICANT CHANGE UP (ref 3.5–5.3)
POTASSIUM SERPL-SCNC: 3.8 MMOL/L — SIGNIFICANT CHANGE UP (ref 3.5–5.3)
PROT SERPL-MCNC: 5.9 GM/DL — LOW (ref 6–8.3)
RBC # BLD: 4.38 M/UL — SIGNIFICANT CHANGE UP (ref 4.2–5.8)
RBC # BLD: 4.46 M/UL — SIGNIFICANT CHANGE UP (ref 4.2–5.8)
RBC # FLD: 15.5 % — HIGH (ref 10.3–14.5)
RBC # FLD: 15.6 % — HIGH (ref 10.3–14.5)
SODIUM SERPL-SCNC: 134 MMOL/L — LOW (ref 135–145)
SODIUM SERPL-SCNC: 135 MMOL/L — SIGNIFICANT CHANGE UP (ref 135–145)
SPECIMEN SOURCE: SIGNIFICANT CHANGE UP
WBC # BLD: 11.67 K/UL — HIGH (ref 3.8–10.5)
WBC # BLD: 11.99 K/UL — HIGH (ref 3.8–10.5)
WBC # FLD AUTO: 11.67 K/UL — HIGH (ref 3.8–10.5)
WBC # FLD AUTO: 11.99 K/UL — HIGH (ref 3.8–10.5)

## 2025-07-21 PROCEDURE — 99233 SBSQ HOSP IP/OBS HIGH 50: CPT

## 2025-07-21 PROCEDURE — 99222 1ST HOSP IP/OBS MODERATE 55: CPT

## 2025-07-21 PROCEDURE — G0545: CPT

## 2025-07-21 PROCEDURE — 78582 LUNG VENTILAT&PERFUS IMAGING: CPT | Mod: 26

## 2025-07-21 PROCEDURE — 99291 CRITICAL CARE FIRST HOUR: CPT

## 2025-07-21 PROCEDURE — 99232 SBSQ HOSP IP/OBS MODERATE 35: CPT

## 2025-07-21 PROCEDURE — 71045 X-RAY EXAM CHEST 1 VIEW: CPT | Mod: 26

## 2025-07-21 RX ORDER — SODIUM CHLORIDE 9 G/1000ML
1000 INJECTION, SOLUTION INTRAVENOUS ONCE
Refills: 0 | Status: COMPLETED | OUTPATIENT
Start: 2025-07-21 | End: 2025-07-21

## 2025-07-21 RX ORDER — VANCOMYCIN HCL IN 5 % DEXTROSE 1.5G/250ML
1250 PLASTIC BAG, INJECTION (ML) INTRAVENOUS ONCE
Refills: 0 | Status: COMPLETED | OUTPATIENT
Start: 2025-07-21 | End: 2025-07-21

## 2025-07-21 RX ADMIN — FINASTERIDE 5 MILLIGRAM(S): 1 TABLET, FILM COATED ORAL at 12:04

## 2025-07-21 RX ADMIN — POLYETHYLENE GLYCOL 3350 17 GRAM(S): 17 POWDER, FOR SOLUTION ORAL at 17:22

## 2025-07-21 RX ADMIN — INSULIN LISPRO 1: 100 INJECTION, SOLUTION INTRAVENOUS; SUBCUTANEOUS at 12:04

## 2025-07-21 RX ADMIN — ATORVASTATIN CALCIUM 10 MILLIGRAM(S): 80 TABLET, FILM COATED ORAL at 21:19

## 2025-07-21 RX ADMIN — SODIUM CHLORIDE 100 MILLILITER(S): 9 INJECTION, SOLUTION INTRAVENOUS at 04:03

## 2025-07-21 RX ADMIN — SODIUM CHLORIDE 100 MILLILITER(S): 9 INJECTION, SOLUTION INTRAVENOUS at 12:05

## 2025-07-21 RX ADMIN — SODIUM CHLORIDE 1000 MILLILITER(S): 9 INJECTION, SOLUTION INTRAVENOUS at 03:04

## 2025-07-21 RX ADMIN — Medication 166.67 MILLIGRAM(S): at 03:03

## 2025-07-21 RX ADMIN — Medication 100 MICROGRAM(S): at 06:03

## 2025-07-21 RX ADMIN — Medication 81 MILLIGRAM(S): at 12:04

## 2025-07-21 RX ADMIN — METOPROLOL SUCCINATE 2.5 MILLIGRAM(S): 50 TABLET, EXTENDED RELEASE ORAL at 18:55

## 2025-07-21 RX ADMIN — POLYETHYLENE GLYCOL 3350 17 GRAM(S): 17 POWDER, FOR SOLUTION ORAL at 05:59

## 2025-07-21 RX ADMIN — Medication 3 MILLIGRAM(S): at 21:19

## 2025-07-21 RX ADMIN — MEROPENEM 100 MILLIGRAM(S): 1 INJECTION INTRAVENOUS at 17:22

## 2025-07-21 RX ADMIN — TAMSULOSIN HYDROCHLORIDE 0.4 MILLIGRAM(S): 0.4 CAPSULE ORAL at 21:18

## 2025-07-21 RX ADMIN — SODIUM CHLORIDE 100 MILLILITER(S): 9 INJECTION, SOLUTION INTRAVENOUS at 17:22

## 2025-07-21 RX ADMIN — MEROPENEM 100 MILLIGRAM(S): 1 INJECTION INTRAVENOUS at 06:00

## 2025-07-22 ENCOUNTER — RESULT REVIEW (OUTPATIENT)
Age: 75
End: 2025-07-22

## 2025-07-22 LAB
-  AMPICILLIN/SULBACTAM: SIGNIFICANT CHANGE UP
-  AMPICILLIN: SIGNIFICANT CHANGE UP
-  AZTREONAM: SIGNIFICANT CHANGE UP
-  CEFAZOLIN: SIGNIFICANT CHANGE UP
-  CEFEPIME: SIGNIFICANT CHANGE UP
-  CEFTRIAXONE: SIGNIFICANT CHANGE UP
-  CEFUROXIME: SIGNIFICANT CHANGE UP
-  CIPROFLOXACIN: SIGNIFICANT CHANGE UP
-  ERTAPENEM: SIGNIFICANT CHANGE UP
-  GENTAMICIN: SIGNIFICANT CHANGE UP
-  IMIPENEM: SIGNIFICANT CHANGE UP
-  LEVOFLOXACIN: SIGNIFICANT CHANGE UP
-  MEROPENEM: SIGNIFICANT CHANGE UP
-  NITROFURANTOIN: SIGNIFICANT CHANGE UP
-  PIPERACILLIN/TAZOBACTAM: SIGNIFICANT CHANGE UP
-  TIGECYCLINE: SIGNIFICANT CHANGE UP
-  TOBRAMYCIN: SIGNIFICANT CHANGE UP
-  TRIMETHOPRIM/SULFAMETHOXAZOLE: SIGNIFICANT CHANGE UP
ANION GAP SERPL CALC-SCNC: 6 MMOL/L — SIGNIFICANT CHANGE UP (ref 5–17)
BUN SERPL-MCNC: 29 MG/DL — HIGH (ref 7–23)
CALCIUM SERPL-MCNC: 9 MG/DL — SIGNIFICANT CHANGE UP (ref 8.5–10.1)
CHLORIDE SERPL-SCNC: 107 MMOL/L — SIGNIFICANT CHANGE UP (ref 96–108)
CO2 SERPL-SCNC: 25 MMOL/L — SIGNIFICANT CHANGE UP (ref 22–31)
CREAT SERPL-MCNC: 2.15 MG/DL — HIGH (ref 0.5–1.3)
CULTURE RESULTS: ABNORMAL
EGFR: 31 ML/MIN/1.73M2 — LOW
EGFR: 31 ML/MIN/1.73M2 — LOW
GLUCOSE BLDC GLUCOMTR-MCNC: 114 MG/DL — HIGH (ref 70–99)
GLUCOSE BLDC GLUCOMTR-MCNC: 165 MG/DL — HIGH (ref 70–99)
GLUCOSE BLDC GLUCOMTR-MCNC: 166 MG/DL — HIGH (ref 70–99)
GLUCOSE BLDC GLUCOMTR-MCNC: 168 MG/DL — HIGH (ref 70–99)
GLUCOSE SERPL-MCNC: 134 MG/DL — HIGH (ref 70–99)
HCT VFR BLD CALC: 36.6 % — LOW (ref 39–50)
HGB BLD-MCNC: 11.9 G/DL — LOW (ref 13–17)
MCHC RBC-ENTMCNC: 27.5 PG — SIGNIFICANT CHANGE UP (ref 27–34)
MCHC RBC-ENTMCNC: 32.5 G/DL — SIGNIFICANT CHANGE UP (ref 32–36)
MCV RBC AUTO: 84.7 FL — SIGNIFICANT CHANGE UP (ref 80–100)
METHOD TYPE: SIGNIFICANT CHANGE UP
NRBC BLD AUTO-RTO: 0 /100 WBCS — SIGNIFICANT CHANGE UP (ref 0–0)
NT-PROBNP SERPL-SCNC: 1412 PG/ML — HIGH (ref 0–450)
ORGANISM # SPEC MICROSCOPIC CNT: ABNORMAL
ORGANISM # SPEC MICROSCOPIC CNT: SIGNIFICANT CHANGE UP
PLATELET # BLD AUTO: 198 K/UL — SIGNIFICANT CHANGE UP (ref 150–400)
POTASSIUM SERPL-MCNC: 3.6 MMOL/L — SIGNIFICANT CHANGE UP (ref 3.5–5.3)
POTASSIUM SERPL-SCNC: 3.6 MMOL/L — SIGNIFICANT CHANGE UP (ref 3.5–5.3)
RBC # BLD: 4.32 M/UL — SIGNIFICANT CHANGE UP (ref 4.2–5.8)
RBC # FLD: 15.8 % — HIGH (ref 10.3–14.5)
SODIUM SERPL-SCNC: 138 MMOL/L — SIGNIFICANT CHANGE UP (ref 135–145)
SPECIMEN SOURCE: SIGNIFICANT CHANGE UP
TSH SERPL-MCNC: 2.52 UU/ML — SIGNIFICANT CHANGE UP (ref 0.36–3.74)
WBC # BLD: 11.24 K/UL — HIGH (ref 3.8–10.5)
WBC # FLD AUTO: 11.24 K/UL — HIGH (ref 3.8–10.5)

## 2025-07-22 PROCEDURE — 71046 X-RAY EXAM CHEST 2 VIEWS: CPT | Mod: 26

## 2025-07-22 PROCEDURE — 99233 SBSQ HOSP IP/OBS HIGH 50: CPT

## 2025-07-22 PROCEDURE — 93356 MYOCRD STRAIN IMG SPCKL TRCK: CPT

## 2025-07-22 PROCEDURE — 99232 SBSQ HOSP IP/OBS MODERATE 35: CPT

## 2025-07-22 PROCEDURE — G0545: CPT

## 2025-07-22 PROCEDURE — 93306 TTE W/DOPPLER COMPLETE: CPT | Mod: 26

## 2025-07-22 RX ORDER — METOPROLOL SUCCINATE 50 MG/1
25 TABLET, EXTENDED RELEASE ORAL DAILY
Refills: 0 | Status: DISCONTINUED | OUTPATIENT
Start: 2025-07-22 | End: 2025-07-23

## 2025-07-22 RX ORDER — LEVALBUTEROL HYDROCHLORIDE 1.25 MG/3ML
0.63 SOLUTION RESPIRATORY (INHALATION) EVERY 6 HOURS
Refills: 0 | Status: DISCONTINUED | OUTPATIENT
Start: 2025-07-22 | End: 2025-07-23

## 2025-07-22 RX ORDER — ATORVASTATIN CALCIUM 80 MG/1
40 TABLET, FILM COATED ORAL AT BEDTIME
Refills: 0 | Status: DISCONTINUED | OUTPATIENT
Start: 2025-07-22 | End: 2025-07-30

## 2025-07-22 RX ADMIN — FINASTERIDE 5 MILLIGRAM(S): 1 TABLET, FILM COATED ORAL at 12:23

## 2025-07-22 RX ADMIN — MEROPENEM 100 MILLIGRAM(S): 1 INJECTION INTRAVENOUS at 17:47

## 2025-07-22 RX ADMIN — SODIUM CHLORIDE 100 MILLILITER(S): 9 INJECTION, SOLUTION INTRAVENOUS at 09:17

## 2025-07-22 RX ADMIN — Medication 100 MICROGRAM(S): at 05:42

## 2025-07-22 RX ADMIN — ATORVASTATIN CALCIUM 40 MILLIGRAM(S): 80 TABLET, FILM COATED ORAL at 21:36

## 2025-07-22 RX ADMIN — LEVALBUTEROL HYDROCHLORIDE 0.63 MILLIGRAM(S): 1.25 SOLUTION RESPIRATORY (INHALATION) at 14:05

## 2025-07-22 RX ADMIN — TAMSULOSIN HYDROCHLORIDE 0.4 MILLIGRAM(S): 0.4 CAPSULE ORAL at 21:36

## 2025-07-22 RX ADMIN — Medication 81 MILLIGRAM(S): at 12:22

## 2025-07-22 RX ADMIN — Medication 2 TABLET(S): at 21:42

## 2025-07-22 RX ADMIN — METOPROLOL SUCCINATE 25 MILLIGRAM(S): 50 TABLET, EXTENDED RELEASE ORAL at 10:06

## 2025-07-22 RX ADMIN — INSULIN LISPRO 1: 100 INJECTION, SOLUTION INTRAVENOUS; SUBCUTANEOUS at 17:47

## 2025-07-22 RX ADMIN — INSULIN LISPRO 1: 100 INJECTION, SOLUTION INTRAVENOUS; SUBCUTANEOUS at 12:23

## 2025-07-22 RX ADMIN — SODIUM CHLORIDE 100 MILLILITER(S): 9 INJECTION, SOLUTION INTRAVENOUS at 09:03

## 2025-07-22 RX ADMIN — MEROPENEM 100 MILLIGRAM(S): 1 INJECTION INTRAVENOUS at 05:41

## 2025-07-23 LAB
ANION GAP SERPL CALC-SCNC: 5 MMOL/L — SIGNIFICANT CHANGE UP (ref 5–17)
BUN SERPL-MCNC: 24 MG/DL — HIGH (ref 7–23)
CALCIUM SERPL-MCNC: 9.2 MG/DL — SIGNIFICANT CHANGE UP (ref 8.5–10.1)
CHLORIDE SERPL-SCNC: 110 MMOL/L — HIGH (ref 96–108)
CHOLEST SERPL-MCNC: 77 MG/DL — SIGNIFICANT CHANGE UP
CO2 SERPL-SCNC: 25 MMOL/L — SIGNIFICANT CHANGE UP (ref 22–31)
CREAT SERPL-MCNC: 1.73 MG/DL — HIGH (ref 0.5–1.3)
EGFR: 40 ML/MIN/1.73M2 — LOW
EGFR: 40 ML/MIN/1.73M2 — LOW
GLUCOSE BLDC GLUCOMTR-MCNC: 131 MG/DL — HIGH (ref 70–99)
GLUCOSE BLDC GLUCOMTR-MCNC: 140 MG/DL — HIGH (ref 70–99)
GLUCOSE BLDC GLUCOMTR-MCNC: 146 MG/DL — HIGH (ref 70–99)
GLUCOSE BLDC GLUCOMTR-MCNC: 218 MG/DL — HIGH (ref 70–99)
GLUCOSE SERPL-MCNC: 119 MG/DL — HIGH (ref 70–99)
HCT VFR BLD CALC: 37.2 % — LOW (ref 39–50)
HDLC SERPL-MCNC: 13 MG/DL — LOW
HGB BLD-MCNC: 12 G/DL — LOW (ref 13–17)
LDLC SERPL-MCNC: 44 MG/DL — SIGNIFICANT CHANGE UP
LIPID PNL WITH DIRECT LDL SERPL: 44 MG/DL — SIGNIFICANT CHANGE UP
MCHC RBC-ENTMCNC: 27.6 PG — SIGNIFICANT CHANGE UP (ref 27–34)
MCHC RBC-ENTMCNC: 32.3 G/DL — SIGNIFICANT CHANGE UP (ref 32–36)
MCV RBC AUTO: 85.5 FL — SIGNIFICANT CHANGE UP (ref 80–100)
NONHDLC SERPL-MCNC: 65 MG/DL — SIGNIFICANT CHANGE UP
NRBC BLD AUTO-RTO: 0 /100 WBCS — SIGNIFICANT CHANGE UP (ref 0–0)
PLATELET # BLD AUTO: 240 K/UL — SIGNIFICANT CHANGE UP (ref 150–400)
POTASSIUM SERPL-MCNC: 3.8 MMOL/L — SIGNIFICANT CHANGE UP (ref 3.5–5.3)
POTASSIUM SERPL-SCNC: 3.8 MMOL/L — SIGNIFICANT CHANGE UP (ref 3.5–5.3)
RBC # BLD: 4.35 M/UL — SIGNIFICANT CHANGE UP (ref 4.2–5.8)
RBC # FLD: 15.9 % — HIGH (ref 10.3–14.5)
SODIUM SERPL-SCNC: 140 MMOL/L — SIGNIFICANT CHANGE UP (ref 135–145)
TRIGL SERPL-MCNC: 107 MG/DL — SIGNIFICANT CHANGE UP
WBC # BLD: 10.03 K/UL — SIGNIFICANT CHANGE UP (ref 3.8–10.5)
WBC # FLD AUTO: 10.03 K/UL — SIGNIFICANT CHANGE UP (ref 3.8–10.5)

## 2025-07-23 PROCEDURE — 76604 US EXAM CHEST: CPT | Mod: 26

## 2025-07-23 PROCEDURE — 99232 SBSQ HOSP IP/OBS MODERATE 35: CPT

## 2025-07-23 PROCEDURE — 71250 CT THORAX DX C-: CPT | Mod: 26

## 2025-07-23 PROCEDURE — 99233 SBSQ HOSP IP/OBS HIGH 50: CPT

## 2025-07-23 PROCEDURE — 99223 1ST HOSP IP/OBS HIGH 75: CPT | Mod: 25

## 2025-07-23 RX ORDER — METOPROLOL SUCCINATE 50 MG/1
12.5 TABLET, EXTENDED RELEASE ORAL DAILY
Refills: 0 | Status: DISCONTINUED | OUTPATIENT
Start: 2025-07-23 | End: 2025-07-30

## 2025-07-23 RX ORDER — IPRATROPIUM BROMIDE AND ALBUTEROL SULFATE .5; 2.5 MG/3ML; MG/3ML
3 SOLUTION RESPIRATORY (INHALATION) EVERY 6 HOURS
Refills: 0 | Status: DISCONTINUED | OUTPATIENT
Start: 2025-07-23 | End: 2025-07-25

## 2025-07-23 RX ADMIN — ATORVASTATIN CALCIUM 40 MILLIGRAM(S): 80 TABLET, FILM COATED ORAL at 22:08

## 2025-07-23 RX ADMIN — Medication 1 DOSE(S): at 19:02

## 2025-07-23 RX ADMIN — Medication 2 TABLET(S): at 22:07

## 2025-07-23 RX ADMIN — IPRATROPIUM BROMIDE AND ALBUTEROL SULFATE 3 MILLILITER(S): .5; 2.5 SOLUTION RESPIRATORY (INHALATION) at 18:43

## 2025-07-23 RX ADMIN — Medication 81 MILLIGRAM(S): at 11:00

## 2025-07-23 RX ADMIN — MEROPENEM 100 MILLIGRAM(S): 1 INJECTION INTRAVENOUS at 17:32

## 2025-07-23 RX ADMIN — FINASTERIDE 5 MILLIGRAM(S): 1 TABLET, FILM COATED ORAL at 11:00

## 2025-07-23 RX ADMIN — TAMSULOSIN HYDROCHLORIDE 0.4 MILLIGRAM(S): 0.4 CAPSULE ORAL at 22:08

## 2025-07-23 RX ADMIN — Medication 100 MICROGRAM(S): at 05:43

## 2025-07-23 RX ADMIN — MEROPENEM 100 MILLIGRAM(S): 1 INJECTION INTRAVENOUS at 05:46

## 2025-07-23 RX ADMIN — IPRATROPIUM BROMIDE AND ALBUTEROL SULFATE 3 MILLILITER(S): .5; 2.5 SOLUTION RESPIRATORY (INHALATION) at 23:19

## 2025-07-24 LAB
ANION GAP SERPL CALC-SCNC: 6 MMOL/L — SIGNIFICANT CHANGE UP (ref 5–17)
BUN SERPL-MCNC: 18 MG/DL — SIGNIFICANT CHANGE UP (ref 7–23)
CALCIUM SERPL-MCNC: 8.8 MG/DL — SIGNIFICANT CHANGE UP (ref 8.5–10.1)
CHLORIDE SERPL-SCNC: 110 MMOL/L — HIGH (ref 96–108)
CO2 SERPL-SCNC: 24 MMOL/L — SIGNIFICANT CHANGE UP (ref 22–31)
CREAT SERPL-MCNC: 1.45 MG/DL — HIGH (ref 0.5–1.3)
CULTURE RESULTS: SIGNIFICANT CHANGE UP
EGFR: 50 ML/MIN/1.73M2 — LOW
EGFR: 50 ML/MIN/1.73M2 — LOW
GLUCOSE BLDC GLUCOMTR-MCNC: 136 MG/DL — HIGH (ref 70–99)
GLUCOSE BLDC GLUCOMTR-MCNC: 173 MG/DL — HIGH (ref 70–99)
GLUCOSE BLDC GLUCOMTR-MCNC: 213 MG/DL — HIGH (ref 70–99)
GLUCOSE BLDC GLUCOMTR-MCNC: 239 MG/DL — HIGH (ref 70–99)
GLUCOSE SERPL-MCNC: 143 MG/DL — HIGH (ref 70–99)
HCT VFR BLD CALC: 37.5 % — LOW (ref 39–50)
HGB BLD-MCNC: 12 G/DL — LOW (ref 13–17)
MCHC RBC-ENTMCNC: 27.2 PG — SIGNIFICANT CHANGE UP (ref 27–34)
MCHC RBC-ENTMCNC: 32 G/DL — SIGNIFICANT CHANGE UP (ref 32–36)
MCV RBC AUTO: 85 FL — SIGNIFICANT CHANGE UP (ref 80–100)
NRBC BLD AUTO-RTO: 0 /100 WBCS — SIGNIFICANT CHANGE UP (ref 0–0)
NT-PROBNP SERPL-SCNC: 1721 PG/ML — HIGH (ref 0–450)
PLATELET # BLD AUTO: 308 K/UL — SIGNIFICANT CHANGE UP (ref 150–400)
POTASSIUM SERPL-MCNC: 3.5 MMOL/L — SIGNIFICANT CHANGE UP (ref 3.5–5.3)
POTASSIUM SERPL-SCNC: 3.5 MMOL/L — SIGNIFICANT CHANGE UP (ref 3.5–5.3)
RBC # BLD: 4.41 M/UL — SIGNIFICANT CHANGE UP (ref 4.2–5.8)
RBC # FLD: 15.6 % — HIGH (ref 10.3–14.5)
SODIUM SERPL-SCNC: 140 MMOL/L — SIGNIFICANT CHANGE UP (ref 135–145)
SPECIMEN SOURCE: SIGNIFICANT CHANGE UP
WBC # BLD: 8.42 K/UL — SIGNIFICANT CHANGE UP (ref 3.8–10.5)
WBC # FLD AUTO: 8.42 K/UL — SIGNIFICANT CHANGE UP (ref 3.8–10.5)

## 2025-07-24 PROCEDURE — 99233 SBSQ HOSP IP/OBS HIGH 50: CPT

## 2025-07-24 PROCEDURE — 99232 SBSQ HOSP IP/OBS MODERATE 35: CPT

## 2025-07-24 RX ORDER — FUROSEMIDE 10 MG/ML
20 INJECTION INTRAMUSCULAR; INTRAVENOUS ONCE
Refills: 0 | Status: COMPLETED | OUTPATIENT
Start: 2025-07-24 | End: 2025-07-24

## 2025-07-24 RX ADMIN — IPRATROPIUM BROMIDE AND ALBUTEROL SULFATE 3 MILLILITER(S): .5; 2.5 SOLUTION RESPIRATORY (INHALATION) at 05:34

## 2025-07-24 RX ADMIN — IPRATROPIUM BROMIDE AND ALBUTEROL SULFATE 3 MILLILITER(S): .5; 2.5 SOLUTION RESPIRATORY (INHALATION) at 17:01

## 2025-07-24 RX ADMIN — Medication 100 MICROGRAM(S): at 05:06

## 2025-07-24 RX ADMIN — TAMSULOSIN HYDROCHLORIDE 0.4 MILLIGRAM(S): 0.4 CAPSULE ORAL at 22:14

## 2025-07-24 RX ADMIN — METOPROLOL SUCCINATE 12.5 MILLIGRAM(S): 50 TABLET, EXTENDED RELEASE ORAL at 06:14

## 2025-07-24 RX ADMIN — Medication 1 DOSE(S): at 06:14

## 2025-07-24 RX ADMIN — IPRATROPIUM BROMIDE AND ALBUTEROL SULFATE 3 MILLILITER(S): .5; 2.5 SOLUTION RESPIRATORY (INHALATION) at 23:34

## 2025-07-24 RX ADMIN — Medication 81 MILLIGRAM(S): at 11:34

## 2025-07-24 RX ADMIN — INSULIN LISPRO 2: 100 INJECTION, SOLUTION INTRAVENOUS; SUBCUTANEOUS at 11:34

## 2025-07-24 RX ADMIN — INSULIN LISPRO 2: 100 INJECTION, SOLUTION INTRAVENOUS; SUBCUTANEOUS at 08:07

## 2025-07-24 RX ADMIN — ATORVASTATIN CALCIUM 40 MILLIGRAM(S): 80 TABLET, FILM COATED ORAL at 22:14

## 2025-07-24 RX ADMIN — MEROPENEM 100 MILLIGRAM(S): 1 INJECTION INTRAVENOUS at 06:14

## 2025-07-24 RX ADMIN — FUROSEMIDE 20 MILLIGRAM(S): 10 INJECTION INTRAMUSCULAR; INTRAVENOUS at 09:33

## 2025-07-24 RX ADMIN — MEROPENEM 100 MILLIGRAM(S): 1 INJECTION INTRAVENOUS at 17:00

## 2025-07-24 RX ADMIN — IPRATROPIUM BROMIDE AND ALBUTEROL SULFATE 3 MILLILITER(S): .5; 2.5 SOLUTION RESPIRATORY (INHALATION) at 11:33

## 2025-07-24 RX ADMIN — FINASTERIDE 5 MILLIGRAM(S): 1 TABLET, FILM COATED ORAL at 11:34

## 2025-07-25 LAB
ANION GAP SERPL CALC-SCNC: 8 MMOL/L — SIGNIFICANT CHANGE UP (ref 5–17)
BUN SERPL-MCNC: 19 MG/DL — SIGNIFICANT CHANGE UP (ref 7–23)
CALCIUM SERPL-MCNC: 9.6 MG/DL — SIGNIFICANT CHANGE UP (ref 8.5–10.1)
CHLORIDE SERPL-SCNC: 107 MMOL/L — SIGNIFICANT CHANGE UP (ref 96–108)
CO2 SERPL-SCNC: 25 MMOL/L — SIGNIFICANT CHANGE UP (ref 22–31)
CREAT SERPL-MCNC: 1.41 MG/DL — HIGH (ref 0.5–1.3)
EGFR: 52 ML/MIN/1.73M2 — LOW
EGFR: 52 ML/MIN/1.73M2 — LOW
GLUCOSE BLDC GLUCOMTR-MCNC: 143 MG/DL — HIGH (ref 70–99)
GLUCOSE BLDC GLUCOMTR-MCNC: 157 MG/DL — HIGH (ref 70–99)
GLUCOSE BLDC GLUCOMTR-MCNC: 201 MG/DL — HIGH (ref 70–99)
GLUCOSE BLDC GLUCOMTR-MCNC: 274 MG/DL — HIGH (ref 70–99)
GLUCOSE SERPL-MCNC: 140 MG/DL — HIGH (ref 70–99)
POTASSIUM SERPL-MCNC: 3.7 MMOL/L — SIGNIFICANT CHANGE UP (ref 3.5–5.3)
POTASSIUM SERPL-SCNC: 3.7 MMOL/L — SIGNIFICANT CHANGE UP (ref 3.5–5.3)
SODIUM SERPL-SCNC: 140 MMOL/L — SIGNIFICANT CHANGE UP (ref 135–145)

## 2025-07-25 PROCEDURE — 99233 SBSQ HOSP IP/OBS HIGH 50: CPT

## 2025-07-25 PROCEDURE — 99232 SBSQ HOSP IP/OBS MODERATE 35: CPT

## 2025-07-25 RX ORDER — IPRATROPIUM BROMIDE AND ALBUTEROL SULFATE .5; 2.5 MG/3ML; MG/3ML
3 SOLUTION RESPIRATORY (INHALATION) EVERY 6 HOURS
Refills: 0 | Status: DISCONTINUED | OUTPATIENT
Start: 2025-07-25 | End: 2025-07-30

## 2025-07-25 RX ORDER — IPRATROPIUM BROMIDE AND ALBUTEROL SULFATE .5; 2.5 MG/3ML; MG/3ML
3 SOLUTION RESPIRATORY (INHALATION) EVERY 6 HOURS
Refills: 0 | Status: DISCONTINUED | OUTPATIENT
Start: 2025-07-25 | End: 2025-07-25

## 2025-07-25 RX ORDER — ALBUTEROL SULFATE 2.5 MG/3ML
2 VIAL, NEBULIZER (ML) INHALATION EVERY 6 HOURS
Refills: 0 | Status: DISCONTINUED | OUTPATIENT
Start: 2025-07-25 | End: 2025-07-30

## 2025-07-25 RX ADMIN — Medication 81 MILLIGRAM(S): at 19:03

## 2025-07-25 RX ADMIN — INSULIN LISPRO 1: 100 INJECTION, SOLUTION INTRAVENOUS; SUBCUTANEOUS at 16:38

## 2025-07-25 RX ADMIN — MEROPENEM 100 MILLIGRAM(S): 1 INJECTION INTRAVENOUS at 19:03

## 2025-07-25 RX ADMIN — METOPROLOL SUCCINATE 12.5 MILLIGRAM(S): 50 TABLET, EXTENDED RELEASE ORAL at 06:55

## 2025-07-25 RX ADMIN — Medication 2 TABLET(S): at 21:19

## 2025-07-25 RX ADMIN — IPRATROPIUM BROMIDE AND ALBUTEROL SULFATE 3 MILLILITER(S): .5; 2.5 SOLUTION RESPIRATORY (INHALATION) at 11:24

## 2025-07-25 RX ADMIN — FINASTERIDE 5 MILLIGRAM(S): 1 TABLET, FILM COATED ORAL at 14:32

## 2025-07-25 RX ADMIN — ATORVASTATIN CALCIUM 40 MILLIGRAM(S): 80 TABLET, FILM COATED ORAL at 21:19

## 2025-07-25 RX ADMIN — TAMSULOSIN HYDROCHLORIDE 0.4 MILLIGRAM(S): 0.4 CAPSULE ORAL at 21:19

## 2025-07-25 RX ADMIN — MEROPENEM 100 MILLIGRAM(S): 1 INJECTION INTRAVENOUS at 05:54

## 2025-07-25 RX ADMIN — IPRATROPIUM BROMIDE AND ALBUTEROL SULFATE 3 MILLILITER(S): .5; 2.5 SOLUTION RESPIRATORY (INHALATION) at 05:47

## 2025-07-25 RX ADMIN — Medication 100 MICROGRAM(S): at 05:54

## 2025-07-25 RX ADMIN — POLYETHYLENE GLYCOL 3350 17 GRAM(S): 17 POWDER, FOR SOLUTION ORAL at 05:54

## 2025-07-26 LAB
ANION GAP SERPL CALC-SCNC: 7 MMOL/L — SIGNIFICANT CHANGE UP (ref 5–17)
BUN SERPL-MCNC: 16 MG/DL — SIGNIFICANT CHANGE UP (ref 7–23)
CALCIUM SERPL-MCNC: 9.4 MG/DL — SIGNIFICANT CHANGE UP (ref 8.5–10.1)
CHLORIDE SERPL-SCNC: 109 MMOL/L — HIGH (ref 96–108)
CO2 SERPL-SCNC: 23 MMOL/L — SIGNIFICANT CHANGE UP (ref 22–31)
CREAT SERPL-MCNC: 1.25 MG/DL — SIGNIFICANT CHANGE UP (ref 0.5–1.3)
CULTURE RESULTS: SIGNIFICANT CHANGE UP
CULTURE RESULTS: SIGNIFICANT CHANGE UP
EGFR: 60 ML/MIN/1.73M2 — SIGNIFICANT CHANGE UP
EGFR: 60 ML/MIN/1.73M2 — SIGNIFICANT CHANGE UP
GLUCOSE BLDC GLUCOMTR-MCNC: 140 MG/DL — HIGH (ref 70–99)
GLUCOSE BLDC GLUCOMTR-MCNC: 160 MG/DL — HIGH (ref 70–99)
GLUCOSE BLDC GLUCOMTR-MCNC: 171 MG/DL — HIGH (ref 70–99)
GLUCOSE SERPL-MCNC: 152 MG/DL — HIGH (ref 70–99)
NT-PROBNP SERPL-SCNC: 241 PG/ML — SIGNIFICANT CHANGE UP (ref 0–450)
POTASSIUM SERPL-MCNC: 3.7 MMOL/L — SIGNIFICANT CHANGE UP (ref 3.5–5.3)
POTASSIUM SERPL-SCNC: 3.7 MMOL/L — SIGNIFICANT CHANGE UP (ref 3.5–5.3)
SODIUM SERPL-SCNC: 139 MMOL/L — SIGNIFICANT CHANGE UP (ref 135–145)
SPECIMEN SOURCE: SIGNIFICANT CHANGE UP
SPECIMEN SOURCE: SIGNIFICANT CHANGE UP

## 2025-07-26 PROCEDURE — 99232 SBSQ HOSP IP/OBS MODERATE 35: CPT

## 2025-07-26 RX ADMIN — Medication 100 MICROGRAM(S): at 06:29

## 2025-07-26 RX ADMIN — INSULIN LISPRO 1: 100 INJECTION, SOLUTION INTRAVENOUS; SUBCUTANEOUS at 16:52

## 2025-07-26 RX ADMIN — MEROPENEM 100 MILLIGRAM(S): 1 INJECTION INTRAVENOUS at 06:29

## 2025-07-26 RX ADMIN — Medication 2 TABLET(S): at 21:40

## 2025-07-26 RX ADMIN — Medication 81 MILLIGRAM(S): at 11:11

## 2025-07-26 RX ADMIN — INSULIN LISPRO 1: 100 INJECTION, SOLUTION INTRAVENOUS; SUBCUTANEOUS at 11:44

## 2025-07-26 RX ADMIN — METOPROLOL SUCCINATE 12.5 MILLIGRAM(S): 50 TABLET, EXTENDED RELEASE ORAL at 06:28

## 2025-07-26 RX ADMIN — MEROPENEM 100 MILLIGRAM(S): 1 INJECTION INTRAVENOUS at 17:27

## 2025-07-26 RX ADMIN — ATORVASTATIN CALCIUM 40 MILLIGRAM(S): 80 TABLET, FILM COATED ORAL at 21:40

## 2025-07-26 RX ADMIN — FINASTERIDE 5 MILLIGRAM(S): 1 TABLET, FILM COATED ORAL at 11:11

## 2025-07-26 RX ADMIN — TAMSULOSIN HYDROCHLORIDE 0.4 MILLIGRAM(S): 0.4 CAPSULE ORAL at 21:40

## 2025-07-27 LAB
ANION GAP SERPL CALC-SCNC: 8 MMOL/L — SIGNIFICANT CHANGE UP (ref 5–17)
BUN SERPL-MCNC: 16 MG/DL — SIGNIFICANT CHANGE UP (ref 7–23)
CALCIUM SERPL-MCNC: 9.2 MG/DL — SIGNIFICANT CHANGE UP (ref 8.5–10.1)
CHLORIDE SERPL-SCNC: 109 MMOL/L — HIGH (ref 96–108)
CO2 SERPL-SCNC: 22 MMOL/L — SIGNIFICANT CHANGE UP (ref 22–31)
CREAT SERPL-MCNC: 1.28 MG/DL — SIGNIFICANT CHANGE UP (ref 0.5–1.3)
EGFR: 58 ML/MIN/1.73M2 — LOW
EGFR: 58 ML/MIN/1.73M2 — LOW
GLUCOSE BLDC GLUCOMTR-MCNC: 128 MG/DL — HIGH (ref 70–99)
GLUCOSE BLDC GLUCOMTR-MCNC: 137 MG/DL — HIGH (ref 70–99)
GLUCOSE BLDC GLUCOMTR-MCNC: 190 MG/DL — HIGH (ref 70–99)
GLUCOSE BLDC GLUCOMTR-MCNC: 213 MG/DL — HIGH (ref 70–99)
GLUCOSE SERPL-MCNC: 141 MG/DL — HIGH (ref 70–99)
HCT VFR BLD CALC: 41.5 % — SIGNIFICANT CHANGE UP (ref 39–50)
HGB BLD-MCNC: 12.9 G/DL — LOW (ref 13–17)
MCHC RBC-ENTMCNC: 26.7 PG — LOW (ref 27–34)
MCHC RBC-ENTMCNC: 31.1 G/DL — LOW (ref 32–36)
MCV RBC AUTO: 85.7 FL — SIGNIFICANT CHANGE UP (ref 80–100)
NRBC BLD AUTO-RTO: 0 /100 WBCS — SIGNIFICANT CHANGE UP (ref 0–0)
PLATELET # BLD AUTO: 442 K/UL — HIGH (ref 150–400)
POTASSIUM SERPL-MCNC: 4 MMOL/L — SIGNIFICANT CHANGE UP (ref 3.5–5.3)
POTASSIUM SERPL-SCNC: 4 MMOL/L — SIGNIFICANT CHANGE UP (ref 3.5–5.3)
RBC # BLD: 4.84 M/UL — SIGNIFICANT CHANGE UP (ref 4.2–5.8)
RBC # FLD: 15.6 % — HIGH (ref 10.3–14.5)
SODIUM SERPL-SCNC: 139 MMOL/L — SIGNIFICANT CHANGE UP (ref 135–145)
WBC # BLD: 11.1 K/UL — HIGH (ref 3.8–10.5)
WBC # FLD AUTO: 11.1 K/UL — HIGH (ref 3.8–10.5)

## 2025-07-27 PROCEDURE — 99232 SBSQ HOSP IP/OBS MODERATE 35: CPT

## 2025-07-27 RX ADMIN — INSULIN LISPRO 1: 100 INJECTION, SOLUTION INTRAVENOUS; SUBCUTANEOUS at 11:41

## 2025-07-27 RX ADMIN — Medication 100 MICROGRAM(S): at 06:37

## 2025-07-27 RX ADMIN — MEROPENEM 100 MILLIGRAM(S): 1 INJECTION INTRAVENOUS at 06:42

## 2025-07-27 RX ADMIN — FINASTERIDE 5 MILLIGRAM(S): 1 TABLET, FILM COATED ORAL at 11:41

## 2025-07-27 RX ADMIN — MEROPENEM 100 MILLIGRAM(S): 1 INJECTION INTRAVENOUS at 18:36

## 2025-07-27 RX ADMIN — INSULIN LISPRO 2: 100 INJECTION, SOLUTION INTRAVENOUS; SUBCUTANEOUS at 18:36

## 2025-07-27 RX ADMIN — ATORVASTATIN CALCIUM 40 MILLIGRAM(S): 80 TABLET, FILM COATED ORAL at 22:59

## 2025-07-27 RX ADMIN — TAMSULOSIN HYDROCHLORIDE 0.4 MILLIGRAM(S): 0.4 CAPSULE ORAL at 22:58

## 2025-07-27 RX ADMIN — METOPROLOL SUCCINATE 12.5 MILLIGRAM(S): 50 TABLET, EXTENDED RELEASE ORAL at 06:38

## 2025-07-27 RX ADMIN — Medication 2 TABLET(S): at 22:58

## 2025-07-27 RX ADMIN — Medication 81 MILLIGRAM(S): at 11:41

## 2025-07-28 LAB
ANION GAP SERPL CALC-SCNC: 4 MMOL/L — LOW (ref 5–17)
BUN SERPL-MCNC: 15 MG/DL — SIGNIFICANT CHANGE UP (ref 7–23)
CALCIUM SERPL-MCNC: 9.6 MG/DL — SIGNIFICANT CHANGE UP (ref 8.5–10.1)
CHLORIDE SERPL-SCNC: 109 MMOL/L — HIGH (ref 96–108)
CO2 SERPL-SCNC: 25 MMOL/L — SIGNIFICANT CHANGE UP (ref 22–31)
CREAT SERPL-MCNC: 1.24 MG/DL — SIGNIFICANT CHANGE UP (ref 0.5–1.3)
EGFR: 60 ML/MIN/1.73M2 — SIGNIFICANT CHANGE UP
EGFR: 60 ML/MIN/1.73M2 — SIGNIFICANT CHANGE UP
GLUCOSE BLDC GLUCOMTR-MCNC: 120 MG/DL — HIGH (ref 70–99)
GLUCOSE BLDC GLUCOMTR-MCNC: 149 MG/DL — HIGH (ref 70–99)
GLUCOSE BLDC GLUCOMTR-MCNC: 153 MG/DL — HIGH (ref 70–99)
GLUCOSE BLDC GLUCOMTR-MCNC: 159 MG/DL — HIGH (ref 70–99)
GLUCOSE SERPL-MCNC: 169 MG/DL — HIGH (ref 70–99)
HCT VFR BLD CALC: 41.5 % — SIGNIFICANT CHANGE UP (ref 39–50)
HGB BLD-MCNC: 13.5 G/DL — SIGNIFICANT CHANGE UP (ref 13–17)
MCHC RBC-ENTMCNC: 27.8 PG — SIGNIFICANT CHANGE UP (ref 27–34)
MCHC RBC-ENTMCNC: 32.5 G/DL — SIGNIFICANT CHANGE UP (ref 32–36)
MCV RBC AUTO: 85.4 FL — SIGNIFICANT CHANGE UP (ref 80–100)
NRBC BLD AUTO-RTO: 0 /100 WBCS — SIGNIFICANT CHANGE UP (ref 0–0)
PLATELET # BLD AUTO: 475 K/UL — HIGH (ref 150–400)
POTASSIUM SERPL-MCNC: 4 MMOL/L — SIGNIFICANT CHANGE UP (ref 3.5–5.3)
POTASSIUM SERPL-SCNC: 4 MMOL/L — SIGNIFICANT CHANGE UP (ref 3.5–5.3)
RBC # BLD: 4.86 M/UL — SIGNIFICANT CHANGE UP (ref 4.2–5.8)
RBC # FLD: 15.5 % — HIGH (ref 10.3–14.5)
SODIUM SERPL-SCNC: 138 MMOL/L — SIGNIFICANT CHANGE UP (ref 135–145)
WBC # BLD: 12.2 K/UL — HIGH (ref 3.8–10.5)
WBC # FLD AUTO: 12.2 K/UL — HIGH (ref 3.8–10.5)

## 2025-07-28 PROCEDURE — 99233 SBSQ HOSP IP/OBS HIGH 50: CPT

## 2025-07-28 PROCEDURE — 99232 SBSQ HOSP IP/OBS MODERATE 35: CPT

## 2025-07-28 PROCEDURE — G0545: CPT

## 2025-07-28 RX ADMIN — INSULIN LISPRO 1: 100 INJECTION, SOLUTION INTRAVENOUS; SUBCUTANEOUS at 11:57

## 2025-07-28 RX ADMIN — POLYETHYLENE GLYCOL 3350 17 GRAM(S): 17 POWDER, FOR SOLUTION ORAL at 17:31

## 2025-07-28 RX ADMIN — FINASTERIDE 5 MILLIGRAM(S): 1 TABLET, FILM COATED ORAL at 11:56

## 2025-07-28 RX ADMIN — ATORVASTATIN CALCIUM 40 MILLIGRAM(S): 80 TABLET, FILM COATED ORAL at 21:18

## 2025-07-28 RX ADMIN — Medication 100 MICROGRAM(S): at 06:15

## 2025-07-28 RX ADMIN — MEROPENEM 100 MILLIGRAM(S): 1 INJECTION INTRAVENOUS at 06:16

## 2025-07-28 RX ADMIN — METOPROLOL SUCCINATE 12.5 MILLIGRAM(S): 50 TABLET, EXTENDED RELEASE ORAL at 06:15

## 2025-07-28 RX ADMIN — Medication 2 TABLET(S): at 21:18

## 2025-07-28 RX ADMIN — MEROPENEM 100 MILLIGRAM(S): 1 INJECTION INTRAVENOUS at 17:44

## 2025-07-28 RX ADMIN — POLYETHYLENE GLYCOL 3350 17 GRAM(S): 17 POWDER, FOR SOLUTION ORAL at 06:15

## 2025-07-28 RX ADMIN — Medication 81 MILLIGRAM(S): at 11:56

## 2025-07-29 LAB
ANION GAP SERPL CALC-SCNC: 6 MMOL/L — SIGNIFICANT CHANGE UP (ref 5–17)
BUN SERPL-MCNC: 19 MG/DL — SIGNIFICANT CHANGE UP (ref 7–23)
CALCIUM SERPL-MCNC: 9.5 MG/DL — SIGNIFICANT CHANGE UP (ref 8.5–10.1)
CHLORIDE SERPL-SCNC: 108 MMOL/L — SIGNIFICANT CHANGE UP (ref 96–108)
CO2 SERPL-SCNC: 25 MMOL/L — SIGNIFICANT CHANGE UP (ref 22–31)
CREAT SERPL-MCNC: 1.36 MG/DL — HIGH (ref 0.5–1.3)
EGFR: 54 ML/MIN/1.73M2 — LOW
EGFR: 54 ML/MIN/1.73M2 — LOW
GLUCOSE BLDC GLUCOMTR-MCNC: 107 MG/DL — HIGH (ref 70–99)
GLUCOSE BLDC GLUCOMTR-MCNC: 144 MG/DL — HIGH (ref 70–99)
GLUCOSE BLDC GLUCOMTR-MCNC: 158 MG/DL — HIGH (ref 70–99)
GLUCOSE BLDC GLUCOMTR-MCNC: 169 MG/DL — HIGH (ref 70–99)
GLUCOSE SERPL-MCNC: 135 MG/DL — HIGH (ref 70–99)
HCT VFR BLD CALC: 41 % — SIGNIFICANT CHANGE UP (ref 39–50)
HGB BLD-MCNC: 13.1 G/DL — SIGNIFICANT CHANGE UP (ref 13–17)
MCHC RBC-ENTMCNC: 27.3 PG — SIGNIFICANT CHANGE UP (ref 27–34)
MCHC RBC-ENTMCNC: 32 G/DL — SIGNIFICANT CHANGE UP (ref 32–36)
MCV RBC AUTO: 85.4 FL — SIGNIFICANT CHANGE UP (ref 80–100)
NRBC BLD AUTO-RTO: 0 /100 WBCS — SIGNIFICANT CHANGE UP (ref 0–0)
PLATELET # BLD AUTO: 524 K/UL — HIGH (ref 150–400)
POTASSIUM SERPL-MCNC: 4.1 MMOL/L — SIGNIFICANT CHANGE UP (ref 3.5–5.3)
POTASSIUM SERPL-SCNC: 4.1 MMOL/L — SIGNIFICANT CHANGE UP (ref 3.5–5.3)
RBC # BLD: 4.8 M/UL — SIGNIFICANT CHANGE UP (ref 4.2–5.8)
RBC # FLD: 15.5 % — HIGH (ref 10.3–14.5)
SODIUM SERPL-SCNC: 139 MMOL/L — SIGNIFICANT CHANGE UP (ref 135–145)
WBC # BLD: 12.34 K/UL — HIGH (ref 3.8–10.5)
WBC # FLD AUTO: 12.34 K/UL — HIGH (ref 3.8–10.5)

## 2025-07-29 PROCEDURE — 99232 SBSQ HOSP IP/OBS MODERATE 35: CPT

## 2025-07-29 PROCEDURE — G0545: CPT

## 2025-07-29 RX ORDER — MEROPENEM 1 G/30ML
1000 INJECTION INTRAVENOUS EVERY 12 HOURS
Refills: 0 | Status: COMPLETED | OUTPATIENT
Start: 2025-07-29 | End: 2025-07-30

## 2025-07-29 RX ORDER — MEROPENEM 1 G/30ML
1000 INJECTION INTRAVENOUS EVERY 8 HOURS
Refills: 0 | Status: DISCONTINUED | OUTPATIENT
Start: 2025-07-29 | End: 2025-07-29

## 2025-07-29 RX ADMIN — TAMSULOSIN HYDROCHLORIDE 0.4 MILLIGRAM(S): 0.4 CAPSULE ORAL at 22:13

## 2025-07-29 RX ADMIN — Medication 2 TABLET(S): at 22:13

## 2025-07-29 RX ADMIN — MEROPENEM 100 MILLIGRAM(S): 1 INJECTION INTRAVENOUS at 11:48

## 2025-07-29 RX ADMIN — INSULIN LISPRO 1: 100 INJECTION, SOLUTION INTRAVENOUS; SUBCUTANEOUS at 11:47

## 2025-07-29 RX ADMIN — POLYETHYLENE GLYCOL 3350 17 GRAM(S): 17 POWDER, FOR SOLUTION ORAL at 18:55

## 2025-07-29 RX ADMIN — METOPROLOL SUCCINATE 12.5 MILLIGRAM(S): 50 TABLET, EXTENDED RELEASE ORAL at 05:57

## 2025-07-29 RX ADMIN — MEROPENEM 100 MILLIGRAM(S): 1 INJECTION INTRAVENOUS at 18:55

## 2025-07-29 RX ADMIN — ATORVASTATIN CALCIUM 40 MILLIGRAM(S): 80 TABLET, FILM COATED ORAL at 22:13

## 2025-07-29 RX ADMIN — FINASTERIDE 5 MILLIGRAM(S): 1 TABLET, FILM COATED ORAL at 11:42

## 2025-07-29 RX ADMIN — Medication 500 MILLILITER(S): at 20:14

## 2025-07-29 RX ADMIN — Medication 100 MICROGRAM(S): at 05:56

## 2025-07-29 RX ADMIN — Medication 81 MILLIGRAM(S): at 11:42

## 2025-07-30 ENCOUNTER — TRANSCRIPTION ENCOUNTER (OUTPATIENT)
Age: 75
End: 2025-07-30

## 2025-07-30 VITALS
HEART RATE: 66 BPM | TEMPERATURE: 97 F | SYSTOLIC BLOOD PRESSURE: 149 MMHG | DIASTOLIC BLOOD PRESSURE: 72 MMHG | RESPIRATION RATE: 18 BRPM | OXYGEN SATURATION: 97 %

## 2025-07-30 LAB
ALBUMIN SERPL ELPH-MCNC: 2.2 G/DL — LOW (ref 3.3–5)
ALP SERPL-CCNC: 137 U/L — HIGH (ref 40–120)
ALT FLD-CCNC: 93 U/L — HIGH (ref 12–78)
ANION GAP SERPL CALC-SCNC: 4 MMOL/L — LOW (ref 5–17)
AST SERPL-CCNC: 89 U/L — HIGH (ref 15–37)
BILIRUB SERPL-MCNC: 0.3 MG/DL — SIGNIFICANT CHANGE UP (ref 0.2–1.2)
BUN SERPL-MCNC: 16 MG/DL — SIGNIFICANT CHANGE UP (ref 7–23)
CALCIUM SERPL-MCNC: 9.3 MG/DL — SIGNIFICANT CHANGE UP (ref 8.5–10.1)
CHLORIDE SERPL-SCNC: 110 MMOL/L — HIGH (ref 96–108)
CO2 SERPL-SCNC: 23 MMOL/L — SIGNIFICANT CHANGE UP (ref 22–31)
CREAT SERPL-MCNC: 1.25 MG/DL — SIGNIFICANT CHANGE UP (ref 0.5–1.3)
EGFR: 60 ML/MIN/1.73M2 — SIGNIFICANT CHANGE UP
EGFR: 60 ML/MIN/1.73M2 — SIGNIFICANT CHANGE UP
GLUCOSE BLDC GLUCOMTR-MCNC: 139 MG/DL — HIGH (ref 70–99)
GLUCOSE BLDC GLUCOMTR-MCNC: 171 MG/DL — HIGH (ref 70–99)
GLUCOSE BLDC GLUCOMTR-MCNC: 180 MG/DL — HIGH (ref 70–99)
GLUCOSE SERPL-MCNC: 147 MG/DL — HIGH (ref 70–99)
HCT VFR BLD CALC: 41.2 % — SIGNIFICANT CHANGE UP (ref 39–50)
HGB BLD-MCNC: 13.4 G/DL — SIGNIFICANT CHANGE UP (ref 13–17)
MCHC RBC-ENTMCNC: 27.6 PG — SIGNIFICANT CHANGE UP (ref 27–34)
MCHC RBC-ENTMCNC: 32.5 G/DL — SIGNIFICANT CHANGE UP (ref 32–36)
MCV RBC AUTO: 84.8 FL — SIGNIFICANT CHANGE UP (ref 80–100)
NRBC BLD AUTO-RTO: 0 /100 WBCS — SIGNIFICANT CHANGE UP (ref 0–0)
PLATELET # BLD AUTO: 504 K/UL — HIGH (ref 150–400)
POTASSIUM SERPL-MCNC: 4.1 MMOL/L — SIGNIFICANT CHANGE UP (ref 3.5–5.3)
POTASSIUM SERPL-SCNC: 4.1 MMOL/L — SIGNIFICANT CHANGE UP (ref 3.5–5.3)
PROT SERPL-MCNC: 7.2 GM/DL — SIGNIFICANT CHANGE UP (ref 6–8.3)
RBC # BLD: 4.86 M/UL — SIGNIFICANT CHANGE UP (ref 4.2–5.8)
RBC # FLD: 15.3 % — HIGH (ref 10.3–14.5)
SODIUM SERPL-SCNC: 137 MMOL/L — SIGNIFICANT CHANGE UP (ref 135–145)
WBC # BLD: 11.26 K/UL — HIGH (ref 3.8–10.5)
WBC # FLD AUTO: 11.26 K/UL — HIGH (ref 3.8–10.5)

## 2025-07-30 PROCEDURE — 99239 HOSP IP/OBS DSCHRG MGMT >30: CPT

## 2025-07-30 RX ORDER — VIBEGRON 75 MG/1
0 TABLET, FILM COATED ORAL
Qty: 0 | Refills: 0 | DISCHARGE

## 2025-07-30 RX ORDER — ATORVASTATIN CALCIUM 80 MG/1
1 TABLET, FILM COATED ORAL
Qty: 0 | Refills: 0 | DISCHARGE
Start: 2025-07-30

## 2025-07-30 RX ORDER — METOPROLOL SUCCINATE 50 MG/1
0 TABLET, EXTENDED RELEASE ORAL
Qty: 0 | Refills: 0 | DISCHARGE

## 2025-07-30 RX ORDER — FINASTERIDE 1 MG/1
1 TABLET, FILM COATED ORAL
Qty: 0 | Refills: 0 | DISCHARGE
Start: 2025-07-30

## 2025-07-30 RX ORDER — OLMESARTAN MEDOXOMIL AND HYDROCHLOROTHIAZIDE 20; 12.5 MG/1; MG/1
1 TABLET ORAL
Qty: 0 | Refills: 0 | DISCHARGE

## 2025-07-30 RX ORDER — ATORVASTATIN CALCIUM 80 MG/1
0 TABLET, FILM COATED ORAL
Qty: 0 | Refills: 0 | DISCHARGE

## 2025-07-30 RX ORDER — TAMSULOSIN HYDROCHLORIDE 0.4 MG/1
1 CAPSULE ORAL
Qty: 0 | Refills: 0 | DISCHARGE
Start: 2025-07-30

## 2025-07-30 RX ORDER — SITAGLIPTIN 100 MG/1
0 TABLET, FILM COATED ORAL
Qty: 0 | Refills: 2 | DISCHARGE

## 2025-07-30 RX ORDER — LEVOTHYROXINE SODIUM 300 MCG
1 TABLET ORAL
Qty: 0 | Refills: 0 | DISCHARGE
Start: 2025-07-30

## 2025-07-30 RX ORDER — ASPIRIN 325 MG
1 TABLET ORAL
Qty: 0 | Refills: 0 | DISCHARGE
Start: 2025-07-30

## 2025-07-30 RX ORDER — ALBUTEROL SULFATE 2.5 MG/3ML
2 VIAL, NEBULIZER (ML) INHALATION
Qty: 0 | Refills: 0 | DISCHARGE
Start: 2025-07-30

## 2025-07-30 RX ORDER — SENNA 187 MG
2 TABLET ORAL
Qty: 0 | Refills: 0 | DISCHARGE
Start: 2025-07-30

## 2025-07-30 RX ORDER — POLYETHYLENE GLYCOL 3350 17 G/17G
17 POWDER, FOR SOLUTION ORAL
Qty: 0 | Refills: 0 | DISCHARGE
Start: 2025-07-30

## 2025-07-30 RX ORDER — VIBEGRON 75 MG/1
1 TABLET, FILM COATED ORAL
Qty: 0 | Refills: 0 | DISCHARGE

## 2025-07-30 RX ORDER — TAMSULOSIN HYDROCHLORIDE 0.4 MG/1
0 CAPSULE ORAL
Qty: 0 | Refills: 0 | DISCHARGE

## 2025-07-30 RX ORDER — OLMESARTAN MEDOXOMIL AND HYDROCHLOROTHIAZIDE 20; 12.5 MG/1; MG/1
0 TABLET ORAL
Qty: 0 | Refills: 0 | DISCHARGE

## 2025-07-30 RX ORDER — EMPAGLIFLOZIN AND METFORMIN HYDROCHLORIDE 12.5; 1 MG/1; MG/1
1 TABLET ORAL
Qty: 0 | Refills: 0 | DISCHARGE

## 2025-07-30 RX ORDER — LEVOTHYROXINE SODIUM 300 MCG
0 TABLET ORAL
Qty: 0 | Refills: 0 | DISCHARGE

## 2025-07-30 RX ORDER — EMPAGLIFLOZIN AND METFORMIN HYDROCHLORIDE 12.5; 1 MG/1; MG/1
0 TABLET ORAL
Qty: 0 | Refills: 0 | DISCHARGE

## 2025-07-30 RX ORDER — METOPROLOL SUCCINATE 50 MG/1
1 TABLET, EXTENDED RELEASE ORAL
Qty: 0 | Refills: 0 | DISCHARGE

## 2025-07-30 RX ADMIN — METOPROLOL SUCCINATE 12.5 MILLIGRAM(S): 50 TABLET, EXTENDED RELEASE ORAL at 05:43

## 2025-07-30 RX ADMIN — INSULIN LISPRO 1: 100 INJECTION, SOLUTION INTRAVENOUS; SUBCUTANEOUS at 12:25

## 2025-07-30 RX ADMIN — FINASTERIDE 5 MILLIGRAM(S): 1 TABLET, FILM COATED ORAL at 12:25

## 2025-07-30 RX ADMIN — INSULIN LISPRO 1: 100 INJECTION, SOLUTION INTRAVENOUS; SUBCUTANEOUS at 18:01

## 2025-07-30 RX ADMIN — MEROPENEM 100 MILLIGRAM(S): 1 INJECTION INTRAVENOUS at 05:43

## 2025-07-30 RX ADMIN — POLYETHYLENE GLYCOL 3350 17 GRAM(S): 17 POWDER, FOR SOLUTION ORAL at 05:43

## 2025-07-30 RX ADMIN — Medication 100 MICROGRAM(S): at 05:43

## 2025-07-30 RX ADMIN — Medication 81 MILLIGRAM(S): at 12:25

## 2025-08-06 DIAGNOSIS — R00.2 PALPITATIONS: ICD-10-CM

## 2025-08-06 DIAGNOSIS — Z79.84 LONG TERM (CURRENT) USE OF ORAL HYPOGLYCEMIC DRUGS: ICD-10-CM

## 2025-08-06 DIAGNOSIS — Z79.890 HORMONE REPLACEMENT THERAPY: ICD-10-CM

## 2025-08-06 DIAGNOSIS — R39.15 URGENCY OF URINATION: ICD-10-CM

## 2025-08-06 DIAGNOSIS — A41.9 SEPSIS, UNSPECIFIED ORGANISM: ICD-10-CM

## 2025-08-06 DIAGNOSIS — I50.31 ACUTE DIASTOLIC (CONGESTIVE) HEART FAILURE: ICD-10-CM

## 2025-08-06 DIAGNOSIS — E03.9 HYPOTHYROIDISM, UNSPECIFIED: ICD-10-CM

## 2025-08-06 DIAGNOSIS — E87.20 ACIDOSIS, UNSPECIFIED: ICD-10-CM

## 2025-08-06 DIAGNOSIS — E44.0 MODERATE PROTEIN-CALORIE MALNUTRITION: ICD-10-CM

## 2025-08-06 DIAGNOSIS — I47.19 OTHER SUPRAVENTRICULAR TACHYCARDIA: ICD-10-CM

## 2025-08-06 DIAGNOSIS — Z79.82 LONG TERM (CURRENT) USE OF ASPIRIN: ICD-10-CM

## 2025-08-06 DIAGNOSIS — N17.1 ACUTE KIDNEY FAILURE WITH ACUTE CORTICAL NECROSIS: ICD-10-CM

## 2025-08-06 DIAGNOSIS — I11.0 HYPERTENSIVE HEART DISEASE WITH HEART FAILURE: ICD-10-CM

## 2025-08-06 DIAGNOSIS — R07.89 OTHER CHEST PAIN: ICD-10-CM

## 2025-08-06 DIAGNOSIS — A41.51 SEPSIS DUE TO ESCHERICHIA COLI [E. COLI]: ICD-10-CM

## 2025-08-06 DIAGNOSIS — Z11.52 ENCOUNTER FOR SCREENING FOR COVID-19: ICD-10-CM

## 2025-08-06 DIAGNOSIS — N12 TUBULO-INTERSTITIAL NEPHRITIS, NOT SPECIFIED AS ACUTE OR CHRONIC: ICD-10-CM

## 2025-08-06 DIAGNOSIS — R65.20 SEVERE SEPSIS WITHOUT SEPTIC SHOCK: ICD-10-CM

## 2025-08-06 DIAGNOSIS — R06.2 WHEEZING: ICD-10-CM

## 2025-08-06 DIAGNOSIS — E11.9 TYPE 2 DIABETES MELLITUS WITHOUT COMPLICATIONS: ICD-10-CM

## 2025-08-08 ENCOUNTER — APPOINTMENT (OUTPATIENT)
Dept: UROLOGY | Facility: CLINIC | Age: 75
End: 2025-08-08
Payer: MEDICARE

## 2025-08-08 VITALS
BODY MASS INDEX: 25.71 KG/M2 | TEMPERATURE: 110 F | SYSTOLIC BLOOD PRESSURE: 106 MMHG | OXYGEN SATURATION: 100 % | HEART RATE: 98 BPM | HEIGHT: 66 IN | DIASTOLIC BLOOD PRESSURE: 63 MMHG | WEIGHT: 160 LBS

## 2025-08-08 DIAGNOSIS — N40.0 BENIGN PROSTATIC HYPERPLASIA WITHOUT LOWER URINARY TRACT SYMPMS: ICD-10-CM

## 2025-08-08 DIAGNOSIS — N45.2 ORCHITIS: ICD-10-CM

## 2025-08-08 DIAGNOSIS — N45.3 EPIDIDYMO-ORCHITIS: ICD-10-CM

## 2025-08-08 PROCEDURE — 99215 OFFICE O/P EST HI 40 MIN: CPT

## 2025-08-22 ENCOUNTER — APPOINTMENT (OUTPATIENT)
Dept: UROLOGY | Facility: CLINIC | Age: 75
End: 2025-08-22
Payer: MEDICARE

## 2025-08-22 VITALS — DIASTOLIC BLOOD PRESSURE: 59 MMHG | HEART RATE: 71 BPM | OXYGEN SATURATION: 96 % | SYSTOLIC BLOOD PRESSURE: 96 MMHG

## 2025-08-22 DIAGNOSIS — N45.3 EPIDIDYMO-ORCHITIS: ICD-10-CM

## 2025-08-22 DIAGNOSIS — N40.0 BENIGN PROSTATIC HYPERPLASIA WITHOUT LOWER URINARY TRACT SYMPMS: ICD-10-CM

## 2025-08-22 DIAGNOSIS — N45.2 ORCHITIS: ICD-10-CM

## 2025-08-22 DIAGNOSIS — N39.0 URINARY TRACT INFECTION, SITE NOT SPECIFIED: ICD-10-CM

## 2025-08-22 PROCEDURE — 51710 CHANGE OF BLADDER TUBE: CPT

## 2025-08-22 RX ORDER — FOSFOMYCIN TROMETHAMINE 3 G/1
3 POWDER ORAL ONCE
Qty: 1 | Refills: 0 | Status: ACTIVE | COMMUNITY
Start: 2025-08-22 | End: 1900-01-01

## 2025-09-05 ENCOUNTER — APPOINTMENT (OUTPATIENT)
Dept: UROLOGY | Facility: CLINIC | Age: 75
End: 2025-09-05
Payer: MEDICARE

## 2025-09-05 VITALS
HEART RATE: 67 BPM | TEMPERATURE: 97 F | OXYGEN SATURATION: 97 % | BODY MASS INDEX: 25.82 KG/M2 | SYSTOLIC BLOOD PRESSURE: 114 MMHG | WEIGHT: 160 LBS | DIASTOLIC BLOOD PRESSURE: 63 MMHG

## 2025-09-05 DIAGNOSIS — N45.2 ORCHITIS: ICD-10-CM

## 2025-09-05 DIAGNOSIS — N39.0 URINARY TRACT INFECTION, SITE NOT SPECIFIED: ICD-10-CM

## 2025-09-05 DIAGNOSIS — N40.0 BENIGN PROSTATIC HYPERPLASIA WITHOUT LOWER URINARY TRACT SYMPMS: ICD-10-CM

## 2025-09-05 DIAGNOSIS — N45.3 EPIDIDYMO-ORCHITIS: ICD-10-CM

## 2025-09-05 PROCEDURE — 99215 OFFICE O/P EST HI 40 MIN: CPT
